# Patient Record
Sex: FEMALE | Race: WHITE
[De-identification: names, ages, dates, MRNs, and addresses within clinical notes are randomized per-mention and may not be internally consistent; named-entity substitution may affect disease eponyms.]

---

## 2018-04-14 NOTE — ED
Eleuterio CASTILLO Jason, scribed for Sabino Salinas MD on 04/14/18 at 1613 .





Abdominal Pain/Male





- HPI Summary


HPI Summary: 


This patient is a 77 year old F presenting to [facility] accompanied by female 

 with a chief complaint of abdominal pain since 3 days ago. She states 

she was recently seen by her PCP and was given antibiotics with a diagnosis of 

diverticulitis. Since the start of her treatment she has been having upper 

abdominal discomfort. The patient rates the pain 3/10 in severity. Symptoms 

aggravated by nothing. Symptoms alleviated by nothing.








- History of Current Complaint


Chief Complaint: EDAbdPain


Stated Complaint: GENERAL ILLNESS


Hx Obtained From: Patient


Onset/Duration: Gradual Onset, Still Present


Pain Intensity: 3


Pain Scale Used: 0-10 Numeric


Location: Epigastric


Aggravating Factor(s): Nothing


Alleviating Factor(s): Nothing


Associated Signs And Symptoms: Positive: Negative - fever





- Allergies/Home Medications


Allergies/Adverse Reactions: 


 Allergies











Allergy/AdvReac Type Severity Reaction Status Date / Time


 


MS Azithromycin Allergy  Nausea And Verified 10/30/17 12:05





[From Zithromax]   Vomiting  











Home Medications: 


 Home Medications





Aspirin EC TAB* [Ecotrin EC Low Dose 81 MG*] 81 mg PO DAILY 04/14/18 [History 

Confirmed 04/14/18]


Calcium Carbonate/Vitamin D3 [Calcium 500 + Vit D Caplet] 1 cap PO DAILY 04/14/ 18 [History Confirmed 04/14/18]


Ciprofloxacin TAB* [Cipro 500 MG TAB*] 500 mg PO BID 04/14/18 [History 

Confirmed 04/14/18]


Citalopram TAB* [CeleXA TAB*] 10 mg PO DAILY 04/14/18 [History Confirmed 04/14/ 18]


Letrozole (NF) [Femara (NF)] 2.5 mg PO DAILY 04/14/18 [History Confirmed 04/14/ 18]


metroNIDAZOLE TAB* [Flagyl 250 mg TAB*] 500 mg PO TID 04/14/18 [History 

Confirmed 04/14/18]











PMH/Surg Hx/FS Hx/Imm Hx


Previously Healthy: Yes


Endocrine/Hematology History: 


   Denies: Hx Diabetes


Cardiovascular History: 


   Denies: Hx Hypertension


 History: 


   Denies: Hx Renal Disease


Musculoskeletal History: 


   Denies: Hx Osteoporosis





- Cancer History


Cancer Type, Location and Year: BREAST WITH RADIATION





- Surgical History


Surgery Procedure, Year, and Place: BIOPSY RIGHT BREAST-PINNED, KNEE REPLACED 

AT [facility] 2013, RIGHT MASCTECOMY 2015, RIGHT KNEE 2017


Infectious Disease History: No


Infectious Disease History: 


   Denies: Traveled Outside the [place] in Last 30 Days





- Family History


Known Family History: 


   Negative: Blood Disorder





- Social History


Alcohol Use: Rare


Substance Use Type: Reports: None


Smoking Status (MU): Never Smoked Tobacco





Review of Systems


Negative: Fever


Positive: Abdominal Pain - epigastric


All Other Systems Reviewed And Are Negative: Yes





Physical Exam





- Summary


Physical Exam Summary: 


General: well-appearing, no pain distress


Skin: warm, color reflects adequate perfusion, dry


Head: normal


Eyes: EOMI, MARKY


ENT: normal


Neck: supple, nontender


Respiratory: CTA, breath sounds present


Cardiovascular: RRR


Abdomen: soft, nontender


Bowel: positive bowel sounds


Musculoskeletal: normal, strength/ROM intact


Neurological: normal, sensory/motor intact, A&O x3


Psychological: affect/mood appropriate





Triage Information Reviewed: Yes


Vital Signs On Initial Exam: 


 Initial Vitals











Temp Pulse Resp BP Pulse Ox


 


 99.3 F   91   16   100/73   98 


 


 04/14/18 12:41  04/14/18 12:41  04/14/18 12:41  04/14/18 12:41  04/14/18 12:41











Vital Signs Reviewed: Yes





Diagnostics





- Vital Signs


 Vital Signs











  Temp Pulse Resp BP Pulse Ox


 


 04/14/18 15:30   86   110/64  97


 


 04/14/18 15:00   89   113/61  100


 


 04/14/18 14:45   90    98


 


 04/14/18 14:44     111/61 


 


 04/14/18 12:41  99.3 F  91  16  100/73  98














- Laboratory


Lab Statement: Any lab studies that have been ordered have been reviewed, and 

results considered in the medical decision making process.





Re-Evaluation





- Re-Evaluation


  ** First Eval


Re-Evaluation Time: 16:05


Change: Improved


Comment: Pt received Zofran and felt better. Patient would like to go home and 

does not want further treatment. She will be discharged and follow up with PCP 

if symptoms return.





Abdominal Pain Fem Course/Dx





- Course


Course Of Treatment: PATIENT IMPROVED AFTER ZOFRAN 4MG ODT. SHE FELT BETTER AND 

WISHED TO LEAVE THE ED WITHOUT FURTHER EVALUATION AND TREATMENT.  PATIENT WILL F

/U WITH PMD; RETURN IF WORSE.





- Diagnoses


Provider Diagnoses: 


 Abdominal pain








Discharge





- Sign-Out/Discharge


Documenting (check all that apply): Discharge





- Discharge Plan


Condition: Stable


Disposition: HOME


Prescriptions: 


Ondansetron ODT TAB* [Zofran 4 MG Odt TAB*] 4 mg PO Q6H PRN #15 tab.odt


 PRN Reason: Nausea


Patient Education Materials:  Acute Abdominal Pain (ED)


Referrals: 


Bayron Grijalva MD [Primary Care Provider] - 


Additional Instructions: 


FOLLOW UP WITH YOUR DOCTOR.


RETURN TO THE EMERGENCY DEPARTMENT FOR ANY WORSENING OF YOUR CONDITION OR 

QUESTIONS OR CONCERNS.





- Billing Disposition and Condition


Condition: STABLE


Disposition: HOME





The documentation as recorded by the Eleuterio lopez Jason accurately 

reflects the service I personally performed and the decisions made by me, 

Sabino Salinas MD.

## 2018-04-15 NOTE — ED
Claudia CASTILLO Abhishek, scribed for Sabino Salinas MD on 04/15/18 at 1530 .





Abdominal Pain/Female





- HPI Summary


HPI Summary: 


The pt is a 76 y/o female presenting to the [facility] with a chief complaint of abd 

pain. The pt reports of vomiting, fatigue, bloated, and previous PMHx of 

diverticulitis. Pt denies urinary symptoms, irregular BM, and hematochezia. The 

pain is stated to be 6/10 in severity. The pt has presented these symptoms to 

the [facility] yesterday and was given anti-nausea medicine. Symptoms are aggravated 

by nothing. Symptoms are alleviated by nothing. 








- History of Current Complaint


Chief Complaint: EDAbdPain


Stated Complaint: NAUSEA/ILLNESS


Time Seen by Provider: 04/15/18 15:10


Hx Obtained From: Patient, Other: - Female 


Onset/Duration: Sudden Onset


Timing: Constant


Severity Initially: Moderate


Severity Currently: Moderate


Pain Intensity: 6


Pain Scale Used: 0-10 Numeric


Location: Diffuse


Aggravating Factor(s): Nothing


Alleviating Factor(s): Nothing


Associated Signs and Symptoms: Positive: Nausea, Vomiting, Other: - fatigue, 

bloating, normal BM, no hematochezia.  Negative: Urinary Symptoms


Allergies/Adverse Reactions: 


 Allergies











Allergy/AdvReac Type Severity Reaction Status Date / Time


 


No Known Allergies Allergy   Verified 04/15/18 15:00














PMH/Surg Hx/FS Hx/Imm Hx


Endocrine/Hematology History: 


   Denies: Hx Diabetes


Cardiovascular History: 


   Denies: Hx Hypertension


 History: 


   Denies: Hx Renal Disease


Musculoskeletal History: 


   Denies: Hx Osteoporosis





- Cancer History


Cancer Type, Location and Year: BREAST WITH RADIATION





- Surgical History


Surgery Procedure, Year, and Place: BIOPSY RIGHT BREAST-PINNED, KNEE REPLACED 

AT [facility] 2013, RIGHT MASCTECOMY 2015, RIGHT KNEE 2017


Infectious Disease History: No


Infectious Disease History: 


   Denies: Traveled Outside the [place] in Last 30 Days





- Family History


Known Family History: 


   Negative: Blood Disorder


Family History: Arthirtis, heart disease, Cancer





- Social History


Alcohol Use: Rare


Substance Use Type: Reports: None


Smoking Status (MU): Never Smoked Tobacco





Review of Systems


Positive: Fatigue


Eyes: Negative


ENT: Negative


Cardiovascular: Negative


Respiratory: Negative


Gastrointestinal: Other - Normal BM


Positive: Abdominal Pain, Vomiting, Other - Negative hematochezia


Positive: no symptoms reported


Musculoskeletal: Negative


Skin: Negative


Neurological: Negative


Psychological: Normal


All Other Systems Reviewed And Are Negative: Yes





Physical Exam





- Summary


Physical Exam Summary: 





General: well-appearing, mild pain distress


Skin: warm, color reflects adequate perfusion, dry


Head: normal


Eyes: EOMI, MARKY


ENT: normal, Real mucosa dry


Neck: supple, nontender


Respiratory: CTA, breath sounds present


Cardiovascular: RRR, No tachy cardia


Abdomen: soft,  No CVA tenderness, Diffuse abd bloating


Bowel: hypoactive bowel sound Tympanea; diffuse tenderness in the left lower 

quadrant and right lower quadrant


Musculoskeletal: normal, strength/ROM intact


Neurological: normal, sensory/motor intact, A&O x3


Psychological: affect/mood appropriate











Triage Information Reviewed: Yes


Vital Signs On Initial Exam: 


 Initial Vitals











Temp Pulse Resp BP Pulse Ox


 


 99.4 F   117   18   90/69   100 


 


 04/15/18 14:58  04/15/18 14:58  04/15/18 14:58  04/15/18 14:58  04/15/18 14:58











Vital Signs Reviewed: Yes





Diagnostics





- Vital Signs


 Vital Signs











  Temp Pulse Resp BP Pulse Ox


 


 04/15/18 14:58  99.4 F  117  18  90/69  100














- Laboratory


Lab Results: 


 Lab Results











  04/15/18 04/15/18 04/15/18 Range/Units





  15:45 15:45 15:45 


 


WBC     (3.5-10.8)  10^3/ul


 


RBC     (4.0-5.4)  10^6/ul


 


Hgb     (12.0-16.0)  g/dl


 


Hct     (35-47)  %


 


MCV     (80-97)  fL


 


MCH     (27-31)  pg


 


MCHC     (31-36)  g/dl


 


RDW     (10.5-15)  %


 


Plt Count     (150-450)  10^3/ul


 


MPV     (7.4-10.4)  um3


 


Neut % (Auto)     (38-83)  %


 


Lymph % (Auto)     (25-47)  %


 


Mono % (Auto)     (0-7)  %


 


Eos % (Auto)     (0-6)  %


 


Baso % (Auto)     (0-2)  %


 


Absolute Neuts (auto)     (1.5-7.7)  10^3/ul


 


Absolute Lymphs (auto)     (1.0-4.8)  10^3/ul


 


Absolute Monos (auto)     (0-0.8)  10^3/ul


 


Absolute Eos (auto)     (0-0.6)  10^3/ul


 


Absolute Basos (auto)     (0-0.2)  10^3/ul


 


Absolute Nucleated RBC     10^3/ul


 


Nucleated RBC %     


 


INR (Anticoag Therapy)  0.94    (0.77-1.02)  


 


APTT  23.2 L    (26.0-36.3)  seconds


 


Sodium    137 L  (139-145)  mmol/L


 


Potassium    4.1  (3.5-5.0)  mmol/L


 


Chloride    102  (101-111)  mmol/L


 


Carbon Dioxide    23  (22-32)  mmol/L


 


Anion Gap    12 H  (2-11)  mmol/L


 


BUN    27 H  (6-24)  mg/dL


 


Creatinine    1.04 H  (0.51-0.95)  mg/dL


 


Est GFR ( Amer)    66.4  (>60)  


 


Est GFR (Non-Af Amer)    51.7  (>60)  


 


BUN/Creatinine Ratio    26.0 H  (8-20)  


 


Glucose    111 H  ()  mg/dL


 


Lactic Acid     (0.5-2.0)  mmol/L


 


Calcium    9.1  (8.6-10.3)  mg/dL


 


Magnesium    2.0  (1.9-2.7)  mg/dL


 


Total Bilirubin    0.60  (0.2-1.0)  mg/dL


 


AST    18  (13-39)  U/L


 


ALT    8  (7-52)  U/L


 


Alkaline Phosphatase    45  ()  U/L


 


Total Creatine Kinase    52  ()  U/L


 


CK-MB (CK-2)    1.7  (0.6-6.3)  ng/mL


 


Troponin I    0.01  (<0.04)  ng/mL


 


C-Reactive Protein    78.76 H  (< 5.00)  mg/L


 


B-Natriuretic Peptide   20  ( - 100) pg/mL


 


Total Protein    6.5  (6.4-8.9)  g/dL


 


Albumin    3.4  (3.2-5.2)  g/dL


 


Globulin    3.1  (2-4)  g/dL


 


Albumin/Globulin Ratio    1.1  (1-3)  


 


TSH    1.24  (0.34-5.60)  mcIU/mL














  04/15/18 04/15/18 Range/Units





  15:45 15:45 


 


WBC  9.3   (3.5-10.8)  10^3/ul


 


RBC  3.90 L   (4.0-5.4)  10^6/ul


 


Hgb  12.0   (12.0-16.0)  g/dl


 


Hct  36   (35-47)  %


 


MCV  92   (80-97)  fL


 


MCH  31   (27-31)  pg


 


MCHC  34   (31-36)  g/dl


 


RDW  14   (10.5-15)  %


 


Plt Count  332   (150-450)  10^3/ul


 


MPV  8.2   (7.4-10.4)  um3


 


Neut % (Auto)  77.6   (38-83)  %


 


Lymph % (Auto)  11.2 L   (25-47)  %


 


Mono % (Auto)  10.0 H   (0-7)  %


 


Eos % (Auto)  0.5   (0-6)  %


 


Baso % (Auto)  0.7   (0-2)  %


 


Absolute Neuts (auto)  7.2   (1.5-7.7)  10^3/ul


 


Absolute Lymphs (auto)  1.0   (1.0-4.8)  10^3/ul


 


Absolute Monos (auto)  0.9 H   (0-0.8)  10^3/ul


 


Absolute Eos (auto)  0   (0-0.6)  10^3/ul


 


Absolute Basos (auto)  0.1   (0-0.2)  10^3/ul


 


Absolute Nucleated RBC  0   10^3/ul


 


Nucleated RBC %  0   


 


INR (Anticoag Therapy)    (0.77-1.02)  


 


APTT    (26.0-36.3)  seconds


 


Sodium    (139-145)  mmol/L


 


Potassium    (3.5-5.0)  mmol/L


 


Chloride    (101-111)  mmol/L


 


Carbon Dioxide    (22-32)  mmol/L


 


Anion Gap    (2-11)  mmol/L


 


BUN    (6-24)  mg/dL


 


Creatinine    (0.51-0.95)  mg/dL


 


Est GFR ( Amer)    (>60)  


 


Est GFR (Non-Af Amer)    (>60)  


 


BUN/Creatinine Ratio    (8-20)  


 


Glucose    ()  mg/dL


 


Lactic Acid   1.4  (0.5-2.0)  mmol/L


 


Calcium    (8.6-10.3)  mg/dL


 


Magnesium    (1.9-2.7)  mg/dL


 


Total Bilirubin    (0.2-1.0)  mg/dL


 


AST    (13-39)  U/L


 


ALT    (7-52)  U/L


 


Alkaline Phosphatase    ()  U/L


 


Total Creatine Kinase    ()  U/L


 


CK-MB (CK-2)    (0.6-6.3)  ng/mL


 


Troponin I    (<0.04)  ng/mL


 


C-Reactive Protein    (< 5.00)  mg/L


 


B-Natriuretic Peptide   ( - 100) pg/mL


 


Total Protein    (6.4-8.9)  g/dL


 


Albumin    (3.2-5.2)  g/dL


 


Globulin    (2-4)  g/dL


 


Albumin/Globulin Ratio    (1-3)  


 


TSH    (0.34-5.60)  mcIU/mL











Result Diagrams: 


 04/15/18 15:45





 04/15/18 15:45


Lab Statement: Any lab studies that have been ordered have been reviewed, and 

results considered in the medical decision making process.





- Radiology


  ** Chest X-ray


Radiology Interpretation Completed By: Radiologist -  CXR reveals Retrocardiac 

density could be atelectasis and/or consolidation. ED Physician has reviewed 

this radiology report





- CT


  ** CT Chest/Abdomen/Pelvis


CT Interpretation Completed By: Radiologist - CT Chest/Abdomen/Pelvis 1. There 

are mixed attenuation and ill-defined masses in the pelvis in the presence of 

large volume peritoneal ascites, soft tissue masses and peritoneal caking. This 

constellation of findings is most consistent with stage IV metastatic cancer of 

a gynecologic origin. The uterus is ill-defined or possibly surgically absent. 

Please correlate to details of the patient's surgical history. Endometrial 

carcinoma versus ovarian cancer are favored. 2. Small left pleural effusion 

with left lung base atelectasis corresponding to the same day chest x-ray 

images. 3. Additional chronic and degenerative changes described in the body 

the report. ED Physician has reviewed this radiology report.





  ** Brain CT


CT Interpretation Completed By: Radiologist - Brain CT reveals Age-appropriate 

findings include appearance of chronic microvascular disease and mild symmetric 

involutional change without acute intracranial findings. ED Physician has 

reviewed this radiology report





- EKG


  ** 1514


EKG Rhythm: Sinus Rhythm - 99 bpm


EKG Interpretation: An EKG at 1514 reveals borderline low voltage extremity 

leads





Abdominal Pain Fem Course/Dx





- Course


Course Of Treatment: DISCUSSED RESULTS WITH PATIENT.  ADMIT HOSPITALIST.





- Diagnoses


Provider Diagnoses: 


 Abdominal pain, Vomiting, Abdominal mass








Discharge





- Sign-Out/Discharge


Documenting (check all that apply): Discharge





- Discharge Plan


Condition: Stable


Disposition: ADMITTED TO [facility]


Referrals: 


Bayron Grijalva MD [Primary Care Provider] - 





- Billing Disposition and Condition


Condition: STABLE


Disposition: [facility]





The documentation as recorded by the Claudia lopez Abhishek accurately reflects 

the service I personally performed and the decisions made by me, Sabino Salinas MD.

## 2018-04-15 NOTE — HP
CC:  Dr. Grijalva; Dr. York *

 

HISTORY AND PHYSICAL:

 

DATE OF ADMISSION:  04/15/18

 

TIME OF EVALUATION:  7:00 p.m.

 

PRIMARY CARE PHYSICIAN:  Dr. Grijalva.

 

CHIEF COMPLAINT:  Abdominal pressure, nausea, and vomiting.

 

HISTORY OF PRESENT ILLNESS:  This is a 77-year-old female with a history of 
breast cancer that is in remission who presents with 3 weeks of abdominal 
pressure and fullness associated with nausea and vomiting.  She has a history 
of diverticulitis in October, so when she presented with similar symptoms 1 
week ago to her PCP, he prescribed Cipro and metronidazole for a presumed 
recurrent diverticulitis. However, she had ongoing nausea, vomiting, and 
abdominal pressure, which has continued to get worse and yesterday came to the 
emergency department but left without being seen.  Today, she came back because 
the abdominal pressure continued and in the emergency department, she has 
findings concerning for metastatic gynecologic cancer in combination with the 
need for symptom control.  We were asked to evaluate for admission.

 

She localizes the pain to the bilateral lower abdomen without radiation and 
describes it more of a discomfort and pressure than pain.  She states that it 
is constant and unchanged with position or eating.  She also localizes the pain 
to the epigastrium; however, she states the pain is quite diffuse and she 
cannot point to it with 1 finger.  It has been associated with nausea off and 
on for the past couple of weeks, approximately a 15-pound weight loss.  She 
denies night sweats, vaginal bleeding, melena, but complains of a very poor 
appetite and extreme fatigue over the past week.

 

PAST MEDICAL HISTORY:

1.  Breast cancer status post lumpectomy in the 90s and then a mastectomy in 
2015, this is currently in remission.

2.  Bilateral total knee replacements.

 

HOME MEDICATIONS:

1.  Citalopram 10 mg daily.

2.  Letrozole 2.5 mg daily.

 

She had recently been taking ciprofloxacin and metronidazole.

 

SOCIAL HISTORY:  She currently lives at [facility] where her  is in the 
skilled section and she is in the independent section.

 

REVIEW OF SYSTEMS:  Positive for fatigue, weight loss, nausea, vomiting.  She 
denies fevers, chills, diarrhea, constipation, lymphadenopathy, melena, vaginal 
bleeding.

 

                               PHYSICAL EXAMINATION

 

GENERAL:  Alert thin female, in no distress.

 

VITAL SIGNS:  Blood pressure 123/71, pulse ox 95% on room air, heart rate 94, 
temperature 100.5.

 

HEENT:  Pupils equal, round, and reactive to light.  Some temporal wasting is 
noted.  Mucosa is dry.  No pharyngeal exudates, erythema.

 

NECK:  I can feel no supraclavicular or cervical lymphadenopathy.  No axillary 
lymphadenopathy.  No JVP.  Thyroid is nonpalpable.

 

LUNGS:  Clear bilaterally.  No crackles.

 

CHEST:  Regular rate and rhythm.

 

ABDOMEN:  Distended with a fluid wave, dullness to percussion.  Her liver is 
palpable at the costal margin.  She is mildly tender to deep palpation 
diffusely.

 

EXTREMITIES:  Bilateral TKA.  Incisions are well healed.  Distal pulses are 2+. 
She has no lower extremity edema.

 

 LABORATORY DATA/DIAGNOSTIC STUDIES:  White blood cells 9.3, hemoglobin 12, 
platelets 332,000.  Lactate 1.4.  Sodium 137, potassium 4.1, bicarb 23, 
chloride 102, BUN 27, creatinine 1.04.

 

Chest, abdomen, and pelvis CT scan was obtained and shows mixed attenuation, ill
- defined masses in the pelvis and the presence of large volume peritoneal 
ascites, soft tissue masses, and peritoneal caking.  This constellation of 
findings is most consistent with stage IV metastatic cancer of gynecological 
origin.  The uterus is ill defined or possibly surgically absent and a small 
left pleural effusion with left lung base atelectasis corresponding to the same 
day chest x-ray images.

 

ASSESSMENT AND PLAN:  This is a 77-year-old female with history of breast cancer
, currently on letrozole who presents with 3 weeks of abdominal pressure and 
nausea and vomiting and was found to have an abnormal CT in the emergency 
department.

 

1.  CT findings concerning for stage IV gynecologic cancer.  Unfortunately, 
this seems to have developed very quickly suggesting a rather aggressive 
cancer.  A CT scan from October showed no gynecologic abnormalities.  I have 
discussed this case with Dr. York and an oncologist will see Ms. Harley in the 
morning to offer recommendations about staging and diagnosis.  I have discussed 
the CT findings with Ms. Harley as well as her friend, Dr. Weeks.  I am 
checking a CA-125 and I believe she will have some relief with a therapeutic 
paracentesis as well as a diagnostic paracentesis to aid in the workup and 
staging.  In the meantime, I will admit her for symptom control and give her 
Zofran and morphine as needed.  She has received 2 L of IV fluids in the 
emergency department.  I believe she would benefit from more volume 
resuscitation as she still appears to be dry.

2.  History of breast cancer.  Continue letrozole.  This does not appear to be 
related.

3.  Depression.  Continue citalopram.

4.  DVT prophylaxis.  SCDs given the positive gastric occult blood finding in 
the emergency department; however, this is a nonspecific finding and I suspect 
it is most likely related to irritation from 3 weeks of vomiting.  Her 
hemoglobin is stable but this should be closely monitored.

 

[id number]/[facility] #: [id number]

[facility] [place]

## 2018-04-16 NOTE — CONS
CONSULTATION REPORT:

 

DATE OF CONSULT:  18

 

REASON FOR CONSULT:  Pelvic mass.

 

IDENTIFICATION:  A 77-year-old female with history of breast cancer x2, who now 
presents with abdominal bloating.

 

HISTORY OF PRESENT ILLNESS:  Ms. Harley is well known to our service for a 
history of breast cancer and is currently on Femara 2.5 mg daily, followed 
every 6 months. She had a knee surgery this past October and then had abdominal 
bloating and pain since then.  She was originally diagnosed with diverticulitis 
and treated with a course of antibiotics.  She had a temporary relief but then 
approximately 4 weeks ago, symptoms reoccurred.  She describes these as 
abdominal pain, bloating, some nausea and decreased appetite. Symptoms are 
progressing weekly. She saw her primary care doctor approximately 1 week ago 
and she had a repeat course of ciprofloxacin and metronidazole.  Abdominal 
bloating and pressure increased as well as increasing nausea and she came into 
the emergency room on 04/15/18.

 

She had a CT scan of the chest, abdomen and pelvis.  There is no pulmonary 
disease, no liver disease, and no clear bone disease.  However, there is 
diffuse omental caking both in the pelvis and the upper abdomen, ascites.  
Majority of the mass is down in the pelvis, it appears to focus on the left 
side representing potentially a complex ovarian cyst.  She had a CA-125 of 395.

 

PAST MEDICAL HISTORY:

1.  Breast cancer.  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 
and radiation.  No hormone therapy.  2014, right-sided nodularity on 
mammogram, confirmed with MRI and a biopsy showed grade 2 ER/CA positive, HER2/
kip negative breast cancer.  She had a mastectomy with T1a disease, 4 mm cancer 
and has subsequently been on Femara which she has tolerated without difficulty.

2.  Osteoarthritis.

 

PAST SURGICAL HISTORY:

1.  Lumpectomy followed by mastectomy.

2.  Cataract removal.

3.  D and C in the past.

4.  Left knee replacement in 2017.

 

ALLERGIES:  ZITHROMAX.

 

FAMILY HISTORY:  Mother had breast cancer at an early age and  in her 70s 
of cancer.  No sisters, no aunts.

 

SOCIAL HISTORY:  .  Retired , never smoked.  She has 2 
children who are both adopted.

 

REVIEW OF SYSTEMS:  General:  Weak, feeling tired.  Allergic/Immunologic: 
Negative.  Eyes:  Negative.  HEENT:  Negative.  Endocrine:  On hormone therapy. 
Hematologic/Lymphatic:  No lymphadenopathy.  Breasts:  Status post mastectomy, 
stable.  Respiratory:  No shortness of breath.  Cardiac:  No chest pain.  GI:  
As above.  :  Has some polyuria and urinates at night.  Musculoskeletal:  
Knee replacement, otherwise negative.  Neurologic:  Negative.  Psychiatric:  
Anxiety.

 

PHYSICAL EXAM:  Temperature 97.5, /67, pulse 80, respirations 16, sat 95%
. HEENT:  Mucosa moist.  No lesions.  Conjunctivae slightly pale.  Neck:  No 
cervical or supraclavicular lymphadenopathy.  Lungs:  Clear to auscultation.  
Heart: Regular rate and rhythm.  S1, S2.  No murmurs, rubs or gallops.  Abdomen
: Distended with caking as well as bowel distention and ascitic fluid.  She has 
bowel sounds.  She is diffusely tender but no rebound or guarding.  Extremities
:  No clubbing, cyanosis or edema.  Neurologic:  Grossly nonfocal.

 

DIAGNOSTIC STUDIES/LAB DATA:  As noted above.

 

ASSESSMENT AND PLAN:  A 77-year-old female with history of breast cancer times 
twice, now with pelvic mass suspicious for ovarian cancer.  We discussed the 
potential for ovarian cancer as well as differential diagnosis of endometrial 
cancer, primary peritoneal cancer, recurrent breast cancer is possible.

 

1.  Agree with plan for paracentesis, send for cytology.  If diagnostic, then 
proceed with treatment.  If nondiagnostic, will need CT-guided or ultrasound-
guided biopsy.

2.  Reviewed that treatment that could include chemotherapy and surgery as well 
as quality of life goals.

3. Discussed and she differed BRCA testing in  as she has no biologic 
children and has a conservative perfective on intervention. However, there are 
therapeutic implications if she has ovarian cancer and should be checked 
pending final diagnosis. 

4.  Will confirm HER2/kip +2 on prior breast cancer and report of negative FISH 
from [place], sent for FISH report. 

5.  Continue IV fluids.

6.  Pain is controlled at this time.

7.  Can go to oncology service as she has been followed in my clinic previously.

 

 [id number]/CPS #: [id number]

LAN

## 2018-04-23 NOTE — RAD
INDICATION: chest port placement



COMPARISONS: None relevant



TECHNIQUE: Fluoroscopy was provided for a vascular access procedure. Total fluoroscopy

time is: 62.5 seconds



FINDINGS: Spot images demonstrate a left-sided chest port from a subclavian approach with

the tip overlying the cavoatrial junction.



IMPRESSION: FLUOROSCOPY WAS PROVIDED FOR A VASCULAR ACCESS PROCEDURE



CPT II Codes:

## 2018-04-23 NOTE — OP
CC:  Dr. York; [facility]*

 

OPERATIVE REPORT:

 

DATE OF OPERATION:  04/23/18 - [facility] [place]

 

DATE OF BIRTH:  [birth year]

 

SURGEON:  Cayetano Fam MD

 

ASSISTANT:  None.



ANESTHESIOLOGIST:  Nina Sanchez MD

 

ANESTHESIA:  Local with monitored anesthesia care.

 

PRE-OP DIAGNOSIS:  Ovarian cancer.

 

POST-OP DIAGNOSIS:  Ovarian cancer.

 

OPERATIVE PROCEDURE:  Insertion of a left chest wall 8-Kinyarwanda PowerPort via a 
left cephalic vein open cut down technique.

 

ESTIMATED BLOOD LOSS:  Minimal.

 

WOUND CLASSIFICATION:  1.

 

COMPLICATIONS:  None.

 

DRAINS:  None.

 

SPECIMENS:  None.

 

DESCRIPTION OF PROCEDURE:  Written informed consent was obtained.  The left 
chest was marked with indelible ink and preoperative antibiotics were 
administered.  The patient was taken to the operating room and placed in the 
supine position. Sequential compression devices and warming blanket were 
applied.  Anesthesia was administered and the left chest and neck were prepped 
and draped in the usual sterile fashion with the left arm at the side.

 

Time-out verification was completed.

 

Initially, 1% lidocaine was infiltrated and the left deltopectoral groove and 
an oblique incision was made between the muscle bodies and identified and 
dissected free of a very acceptable size cephalic vein, which was encircled in 
2 places with 3-0 silk ties.

 

A venotomy was then made and the catheter under direct vision was advanced into 
the vein and passed under fluoroscopy into the right heart at the junction of 
the superior vena cava in the right atrium.  This catheter withdrew blood well 
with flushed nicely.

 

Lidocaine was infiltrated along the left anterior chest wall in medial and 
slightly inferior to my incision.  I developed a subcutaneous pocket down in 
the muscle fascia in preparation for placement of the port.  Once this was 
adequate size, the catheter was adjusted to the correct length using fluoro and 
cut to the appropriate length and attached to the port in the usual fashion and 
subsequently placed in the pocket.  It was sutured to the muscle fascia in 2 
places with 2-0 Prolene sutures.

 

The catheter flushed well and withdrew blood without difficulty.  It was 
subsequently flushed with saline and then heparin solution.

 

Hemostasis was assured.  I ligated the distal left cephalic vein.  The oblique 
incision was closed with running 3-0 and 4-0 subcuticular Vicryl sutures.  Steri
- Strips and occlusive dressing were applied.  The patient tolerated the 
procedure well and was taken to the recovery room in stable condition.

 

 [id number]/[facility] #: [id number]

MTDD

## 2018-04-23 NOTE — RAD
HISTORY: Status post PowerPort placement



COMPARISONS: April 15, 2018



VIEWS: 1: frontal portable view of the chest at 3:01 PM



FINDINGS:

LINES AND TUBES: A left-sided chest port is noted from a subclavian approach with the tip

overlying the cavoatrial junction.

CARDIOMEDIASTINAL SILHOUETTE: The cardiomediastinal silhouette is normal for portable

technique.

PLEURA: The costophrenic angles are sharp. No pleural abnormalities are noted. There is no

appreciable pneumothorax.

LUNG PARENCHYMA: There is minimal linear opacification of the left lung base

ABDOMEN: The upper abdomen is clear. There is no subphrenic gas.

BONES AND SOFT TISSUES: Degenerative changes are noted.



IMPRESSION: 

1.  LINES AND TUBES AS ABOVE. NO ACTIVE CARDIOPULMONARY DISEASE.

2.  LINEAR ATELECTASIS OF THE LEFT LUNG BASE.

## 2018-04-23 NOTE — BRIEFOPN
Brief Operative Note





- Surgery


Procedures: 


Procedures








OPERATIVE REPORT





PRE-OP: Ovarian cancer





POST-OP: Same





PROCEDURE: insertion of right chest wall 8F PowerPort via left cephalic vein cut

-down





SURGEON: MD Cristel


ANESTHESIA:Local with MAC         Dr. Sanchez


ASST: none


IVF:min


EBL:min


SPECIMEN:none


DRAIN: none


WOUND CLASS:one


COMPLICATIONS: none


TO PACU

## 2018-04-29 NOTE — RAD
Indication: Nausea and vomiting. Abdominal pain. Assess for small bowel obstruction.

History of diverticulitis.



Comparison: April 23, 2018 chest radiograph. April 15, 2018 CT.



Technique: Supine and upright views of the abdomen.



Report: Small bilateral dependent pleural effusions and basilar consolidation most

suspicious for atelectasis without significant change.



No radiographic evidence for free air.



Moderate hiatal hernia. Negative for dilated bowel loops. Central displacement of the gas

containing bowel loops consistent with presence of ascites as previously documented on

April 15, 2018 abdomen CT. Moderate stool in the colon without significant rectal

distension.



Calcifications at the RIGHT upper quadrant corresponding with gallstones on prior CT exam.



Unremarkable soft tissue contours.



IMPRESSION: 

1. Small bilateral dependent pleural effusions and basilar consolidation most suspicious

for atelectasis without significant change. 

2. Large volume of ascites.

3. Cholelithiasis.

4. No evidence for bowel obstruction.

## 2018-04-30 NOTE — PN
Progress Note





- Progress Note


Date of Service: 04/30/18


SOAP: 


Subjective:


[]Feeling a little better today than she did on admission.  Less emesis, though 

remains nauseated near constantly with food apathy.  No further hematemesis 

that she is aware of.  Urinating well and having regular BMs.  Abd. feeling 

more full and curious about paracentesis.


Weak and always tired.  Has been getting OOB and ambulating short distances.


Aware of plan for chemotherapy and agreeable to this.


Wants to discuss transfusion with friend.





Medications:


Acetaminophen (Tylenol Tab*)  650 mg PO Q4H PRN


   PRN Reason: FEVER/PAIN


   Last Admin: 04/27/18 23:20 Dose:  650 mg


Citalopram Hydrobromide (Celexa Tab*)  10 mg PO QAM [facility]


   Last Admin: 04/29/18 08:20 Dose:  10 mg


Enoxaparin Sodium (Lovenox(*))  40 mg SUBCUT Q24H [facility]


   Last Admin: 04/29/18 16:46 Dose:  40 mg


Heparin Sodium (Porcine) (Heparin Flush Port (Ivad)**)  5 ml FLUSH DAILY [facility]


   PRN Reason: Protocol


   Last Admin: 04/29/18 08:23 Dose:  Not Given


Sodium Chloride (Ns 0.9% 1000 Ml*)  1,000 mls @ 80 mls/hr IV PER RATE [facility]


   Last Admin: 04/30/18 03:29 Dose:  80 mls/hr


Ondansetron HCl (Zofran Inj*)  8 mg IV Q6H PRN


   PRN Reason: NAUSEA/VOMITING


   Last Admin: 04/29/18 21:22 Dose:  8 mg


Oxycodone HCl (Roxycodone Tab*)  5 mg PO Q4H PRN


   PRN Reason: PAIN


Pantoprazole Sodium (Protonix Iv*)  40 mg IV DAILY [facility]


   Last Admin: 04/29/18 08:21 Dose:  40 mg


Prochlorperazine Edisylate (Compazine Inj*)  10 mg IV Q6H PRN


   PRN Reason: NAUSEA


   Last Admin: 04/30/18 04:03 Dose:  10 mg


Senna (Senokot Tab*)  1 tab PO BID [facility]


   Last Admin: 04/30/18 00:15 Dose:  Not Given


Zolpidem Tartrate (Ambien Tab*)  5 mg PO BEDTIME [facility]


   Last Admin: 04/29/18 21:23 Dose:  5 mg





Objective:


[]


 Vital Signs











Temp Pulse Resp BP Pulse Ox


 


 98.2 F   97   20   117/55   98 


 


 04/30/18 07:27  04/30/18 07:27  04/30/18 07:27  04/30/18 07:27  04/30/18 07:27








A&Ox3, EOMI, WATKINS, neuro grossly non-focal


HRR, S1S2 without murmur noted


LS clear bilat.


+BS, abd. softly distended with +fluid wave





Assessment:


[]76 yo female with recently diagnosed locally advanced ovarian cancer admitted 

with hematemesis which appears resolved (likely secondary to esophageal tear 

with wretching).  Treatment has been partially delayed by pt.'s declining 

performance status, however one goal of treatment is to improved pt.'s 

symptoms.  We discussed pursuing cycle 1 today as inpatient and likely staying 

in the hospital for another day or so with cont.'d IV support.








Plan:


[]1. Chemotherapy today: Carboplating AUC 6 [id number] and Paclitaxel 60 mg/m2 

weekly


2. Recommend 1 unit PRBCs today, reviewed risks and benefits and she is apt to 

agree but would like to discuss with her friend/advocate Dr. Flores


3. Suggest appetite stimulant as nutritional status is very poor, will trial 

marinol 5 mg PO ac


4. Consider paracentesis tomorrow

## 2018-05-01 NOTE — PN
Progress Note





- Progress Note


Date of Service: 05/01/18


SOAP: 


Subjective:


[]Feeling crummy today and frustrated as yesterday evening felt much better.


Tired and nauseated.  No emesis.


Not hungry.  Feels "pilled out".


Has a headache.


Very full feeling and agreeable to paracentesis.





Medications:


Acetaminophen (Tylenol Tab*)  650 mg PO Q4H PRN


   PRN Reason: FEVER/PAIN


   Last Admin: 04/27/18 23:20 Dose:  650 mg


Citalopram Hydrobromide (Celexa Tab*)  10 mg PO QAM [facility]


   Last Admin: 05/01/18 09:57 Dose:  10 mg


Dronabinol (Marinol Cap*)  5 mg PO AC [facility]


   Last Admin: 05/01/18 07:58 Dose:  5 mg


Enoxaparin Sodium (Lovenox(*))  40 mg SUBCUT Q24H [facility]


   Last Admin: 04/30/18 17:06 Dose:  40 mg


Heparin Sodium (Porcine) (Heparin Flush Port (Ivad)**)  5 ml FLUSH DAILY [facility]


   PRN Reason: Protocol


   Last Admin: 05/01/18 09:49 Dose:  Not Given


Sodium Chloride (Ns 0.9% 1000 Ml*)  1,000 mls @ 80 mls/hr IV PER RATE [facility]


   Last Admin: 05/01/18 04:27 Dose:  80 mls/hr


Ondansetron HCl (Zofran Inj*)  8 mg IV Q6H PRN


   PRN Reason: NAUSEA/VOMITING


   Last Admin: 04/29/18 21:22 Dose:  8 mg


Oxycodone HCl (Roxycodone Tab*)  5 mg PO Q4H PRN


   PRN Reason: PAIN


Pantoprazole Sodium (Protonix Iv*)  40 mg IV DAILY [facility]


   Last Admin: 05/01/18 09:57 Dose:  40 mg


Prochlorperazine Edisylate (Compazine Inj*)  10 mg IV Q6H PRN


   PRN Reason: NAUSEA


   Last Admin: 05/01/18 10:04 Dose:  10 mg


Senna (Senokot Tab*)  1 tab PO BID [facility]


   Last Admin: 05/01/18 09:57 Dose:  1 tab


Zolpidem Tartrate (Ambien Tab*)  5 mg PO BEDTIME [facility]


   Last Admin: 05/01/18 01:52 Dose:  Not Given





Objective:


[]


 Vital Signs











Temp Pulse Resp BP Pulse Ox


 


 97.6 F   86   14   123/72   94 


 


 05/01/18 07:36  05/01/18 07:36  05/01/18 10:12  05/01/18 07:36  05/01/18 07:36





A&Ox3, EOMI, WATKINS, neuro grossly non-focal


HRR, S1S2


LS clear


+BS, abd. round and softly distended with +fluid wave


LLQ bruise extending to groin with purple and yellow decreased from 4/27


+PP=bilat., no edema noted


 Laboratory Results - last 24 hr











  04/29/18 05/01/18 05/01/18





  05:30 05:49 05:49


 


WBC   7.9 


 


RBC   3.16 L 


 


Hgb   9.8 L 


 


Hct   29 L 


 


MCV   92 


 


MCH   31 


 


MCHC   34 


 


RDW   15 


 


Plt Count   329 


 


MPV   7.6 


 


Neut % (Auto)   91.0 H 


 


Lymph % (Auto)   6.9 L 


 


Mono % (Auto)   1.9 


 


Eos % (Auto)   0 


 


Baso % (Auto)   0.2 


 


Absolute Neuts (auto)   7.2 


 


Absolute Lymphs (auto)   0.5 L 


 


Absolute Monos (auto)   0.2 


 


Absolute Eos (auto)   0 


 


Absolute Basos (auto)   0 


 


Absolute Nucleated RBC   0 


 


Nucleated RBC %   0.1 


 


Sodium    136 L


 


Potassium    3.9


 


Chloride    108


 


Carbon Dioxide    20 L


 


Anion Gap    8


 


BUN    23


 


Creatinine    0.91


 


Est GFR ( Amer)    77.5


 


Est GFR (Non-Af Amer)    60.3


 


BUN/Creatinine Ratio    25.3 H


 


Glucose    183 H


 


Calcium    8.1 L


 


Magnesium    2.0


 


Total Bilirubin    0.70


 


AST    31


 


ALT    13


 


Alkaline Phosphatase    44


 


Total Protein    5.3 L


 


Albumin    2.6 L


 


Globulin    2.7


 


Albumin/Globulin Ratio    1.0


 


Prealbumin    14 L


 


Blood Type  A Positive  


 


Antibody Screen  Negative  


 


Crossmatch  See Detail  











Assessment:


[]78 yo female with newly diagnosed locally advanced ovarian cancer now C1D2 

Carboplatin with weekly Taxol.  Ingleside much better yesterday and likely related 

to steroid pre-med.  Discussed therapeutic paracentesis today of which she 

consented and hopefully this will provide some relief as well.





Plan:


[]1. Malignant Ascites: Therapeutic Paracentesis as per procedure note


2. Nausea: related to chemotherapy and underlying disease, give Dex. 8 mg IV now

, cont. PRN meds though suggest avoiding ondansetron for today if possible.  

Will add PRN ativan for rescue trial


3. Headache: may be related 5HT3 antagonist given yesterday (Palonsetron), 

avoid ondansetron today if possible


4. Anorexia: cont. marinol and high calorie drinks for snacks, cont. IV fluids d

/t poor intake, tylenol PO OK


5. Anemia: improved with transfusion, cont. daily counts

## 2018-05-01 NOTE — PROCNOTE
Hematology/Oncology Procedure


Hematology/Oncology Procedure Note: 


Therapeutic Paracentesis:


Informed consent obtained.  Time out performed per protocol.  Anesthesia 1% 

lidocaine, approx. 1 mL administered to left lower quad with good effect.  

Paracentesis performed to left lower quad without obvious complications.  

Sanginous fluid unchanged from prior procedures.  Pt. tolerated well.

## 2018-05-02 NOTE — DS
CC:  Dr. Grijalva

 

DISCHARGE SUMMARY:

 

DATE OF ADMISSION:  04/27/18

 

DATE OF DISCHARGE:  05/02/18

 

DISCHARGE DIAGNOSES:

1.  Dehydration.

2.  GI bleed.

3.  Metastatic ovarian cancer, treatment with chemotherapy.

4.  Refractory ascites.

 

HOSPITAL COURSE:  She came in on 04/27/18 when she presented on Friday with dehydration as well as an
 episode of hematemesis.  She had a hemoglobin that dropped from baseline of 12 over the course of 2 
weeks down to 8.1.  She had reported an episode of hematemesis 2 days prior to admission.  She also h
ad excessive bleeding after a paracentesis.  She was in the hospital over the weekend and given IV fl
uids as well as 1 unit of packed red blood cells.  On 05/01/18, we proceeded with chemotherapy with c
arboplatin and Taxol.  She received and carboplatin AUC of 6 and Taxol has been given weekly 80 mg/m2
 each Monday.  She tolerated the chemotherapy well and has improved significantly over the past 2 day
s.  She did not have any additional episodes of hematemesis.  She has had some nausea after chemother
apy.  This responded best to Marinol.  She had a repeat paracentesis with bloody fluid and then she h
as had blood cells drained since then. I suspect her anemia is secondary to tumor.  She received IV f
luids and has had some lower extremity edema, albumin is 2.3.  She is eating much better today than s
he has been over the past several weeks.

 

We discussed today being at home versus being in the hospital.  I would like to see her out of the [facility].  She should call our office, however, with any difficulties.  Specifically, temperature of a 
100.4 would need immediate reevaluation.

 

DISCHARGE PLAN:

1.  She will go home to [facility] today.  She will need frequent dressing reddy
es and assistance with daily activities.  I am hopeful over the next 4 weeks, she can return to her a
partment.

2.  Anemia.  Her hemoglobin is down to 8.7 today and I think it is bleeding from her tumor itself.  JENNIFER leach will recheck a CBC on Friday.

3.  She has had regular diet and eating better.  She continued Marinol 2.5 mg with meals p.r.n. for n
ausea.

4.  She had taken oxycodone in the hospital and I sent a prescription home with her followup 5 mg q.4
 p.r.n.  Pain has been well controlled over the past 48 hours.

5.  We will plan on seeing her back in clinic on Monday, which will also be the date of her chemother
apy and she will receive Taxol.

 

 [id number]/[facility] #: [id number]

## 2018-05-02 NOTE — PN
Progress Note





- Progress Note


Date of Service: 05/02/18


SOAP: 


ADDENDUM: Slight drop in Hgb I suspect if bleeding from tumor, will check CBC 

on Friday.

## 2018-05-02 NOTE — PN
Progress Note





- Progress Note


Date of Service: 05/02/18


SOAP: 


Subjective:


[]Better today. Ate well yesterday. Marinol helps with nausea but make her 

"loopy".  Up and walking, less nausea and feeling well. Still some drainage 

from paracentesis. 








Acetaminophen (Tylenol Tab*)  650 mg PO Q4H PRN


   PRN Reason: FEVER/PAIN


   Last Admin: 04/27/18 23:20 Dose:  650 mg


Citalopram Hydrobromide (Celexa Tab*)  10 mg PO QAM [facility]


   Last Admin: 05/02/18 08:25 Dose:  10 mg


Dronabinol (Marinol Cap*)  5 mg PO AC [facility]


   Last Admin: 05/02/18 08:25 Dose:  5 mg


Enoxaparin Sodium (Lovenox(*))  40 mg SUBCUT Q24H [facility]


   Last Admin: 05/01/18 16:52 Dose:  40 mg


Heparin Sodium (Porcine) (Heparin Flush Port (Ivad)**)  5 ml FLUSH DAILY [facility]


   PRN Reason: Protocol


   Last Admin: 05/01/18 16:52 Dose:  5 ml


Lorazepam (Ativan Inj*)  0.5 mg IV PUSH Q6H PRN


   PRN Reason: Anxiety/nausea


Ondansetron HCl (Zofran Inj*)  8 mg IV Q6H PRN


   PRN Reason: NAUSEA/VOMITING


   Last Admin: 04/29/18 21:22 Dose:  8 mg


Oxycodone HCl (Roxycodone Tab*)  5 mg PO Q4H PRN


   PRN Reason: PAIN


Pantoprazole Sodium (Protonix Iv*)  40 mg IV DAILY [facility]


   Last Admin: 05/02/18 08:25 Dose:  40 mg


Prochlorperazine Edisylate (Compazine Inj*)  10 mg IV Q6H PRN


   PRN Reason: NAUSEA


   Last Admin: 05/01/18 10:04 Dose:  10 mg


Senna (Senokot Tab*)  1 tab PO BID [facility]


   Last Admin: 05/02/18 08:25 Dose:  1 tab


Zolpidem Tartrate (Ambien Tab*)  5 mg PO BEDTIME [facility]


   Last Admin: 05/01/18 21:16 Dose:  Not Given











Objective:


[]


 Vital Signs











Temp Pulse Resp BP Pulse Ox


 


 97.7 F   82   18   111/47   97 


 


 05/02/18 07:34  05/02/18 07:34  05/02/18 08:25  05/02/18 07:34  05/02/18 07:34














A&Ox3, EOMI, WATKINS, neuro grossly non-focal


HRR, S1S2


LS clear


+BS, abd. round and softly distended with +fluid wave, non tender and improved. 


serosanguineous fluid filling bandage


LLQ bruise extending to groin with purple and yellow decreased 


+2 MAYUR, BL


 





Assessment:


[]76 yo female with newly diagnosed locally advanced ovarian cancer now C1D2 

Carboplatin with weekly Taxol.  Again feels well today. Eating well and 

strength is improved. 





I expect that she ill have up and down day after chemotherapy but trajectory 

should be positive. Will plan discharge today to [facility] [facility]. 





Plan:


[]1. Malignant Ascites. Follow and expect drainage to stop in next day. 

Dressing changes at [facility]. 


2. Nausea. Better and will go home on Marinol, go to 2.5 mg po prn. 


3. MAYUR. Allow body to clear, will improve over time


4. Discussed when to call office including call for fever > 100.4. 


5. Anemia: improved with transfusion,follow at this time.

## 2018-05-05 NOTE — RAD
INDICATION:  Abdominal pain evaluate for small bowel obstruction.



COMPARISON:  Comparison is made with a prior x-ray study of the abdomen from April 29, 2018.



TECHNIQUE: Supine and upright  views of the abdomen were obtained.



FINDINGS: The small bowel and colon appear nondistended. No free intraperitoneal air is

seen.



There are calcifications which project over the right mid abdomen most consistent with

gallstones.



There is a left basilar infiltrate and pleural effusion present which appears unchanged.



IMPRESSION:  

1. NO EVIDENCE FOR OBSTRUCTION.

2. CHOLELITHIASIS.

3. LEFT BASILAR INFILTRATE AND PLEURAL EFFUSION, UNCHANGED.

## 2018-05-07 NOTE — PN
Progress Note





- Progress Note


Date of Service: 05/07/18


SOAP: 


Subjective:


[ She has had difficulty with confusion. Vomiting is better but not eating 

much.  Has abdominal distension but does not hurt. Wants to continue with her 

chemotherapy through fist cycle but has had a difficult week. ]











Cholecalciferol (Vitamin D Tab*)  2,000 units PO QAM [facility]


   Last Admin: 05/06/18 11:19 Dose:  2,000 units


Citalopram Hydrobromide (Celexa Tab*)  10 mg PO QAM [facility]


   Last Admin: 05/06/18 11:19 Dose:  10 mg


Docusate Sodium (Colace Cap*)  100 mg PO BID [facility]


   Last Admin: 05/06/18 20:21 Dose:  Not Given


Dronabinol (Marinol Cap*)  2.5 mg PO AC PRN


   PRN Reason: NAUSEA


   Last Admin: 05/06/18 17:56 Dose:  2.5 mg


Enoxaparin Sodium (Lovenox(*))  40 mg SUBCUT Q24H [facility]


   Last Admin: 05/06/18 20:15 Dose:  40 mg


Hydromorphone HCl (Dilaudid Inj*)  1 mg IV SLOW PU Q4H PRN


   PRN Reason: PAIN


Sodium Chloride (Ns 0.9% 1000 Ml*)  1,000 mls @ 75 mls/hr IV PER RATE [facility]


   Last Admin: 05/07/18 01:51 Dose:  75 mls/hr


Letrozole (Femara (Nf))  [facility]


   PRN Reason: Protocol


Lorazepam (Ativan Tab(*))  0.5 mg SL Q6H PRN


   PRN Reason: ANXIETY


   Last Admin: 05/06/18 23:45 Dose:  0.5 mg


Omeprazole (Prilosec Cap*)  20 mg PO DAILY [facility]


   Last Admin: 05/06/18 12:25 Dose:  20 mg


Ondansetron HCl (Zofran Odt Tab*)  4 mg PO Q6H PRN


   PRN Reason: NAUSEA


   Last Admin: 05/07/18 01:09 Dose:  4 mg


Pharmacy Profile Note (Scopolamine Patch Remove*)  1 note PATCH OFF Q72H [facility]


Polyethylene Glycol/Electrolytes (Miralax*)  17 gm PO DAILY [facility]


   Last Admin: 05/06/18 11:21 Dose:  Not Given


Prochlorperazine Edisylate (Compazine Inj*)  10 mg IV Q6H PRN


   PRN Reason: NAUSEA/VOMITING


   Last Admin: 05/07/18 06:34 Dose:  10 mg


Scopolamine (Transderm-Scop 1.5 Mg Patch*)  1 patch TRANSDERM Q72H [facility]


   PRN Reason: NAUSEA


   Last Admin: 05/05/18 02:18 Dose:  1 patch


Senna (Senokot Tab*)  1 tab PO BID [facility]


   Last Admin: 05/06/18 20:21 Dose:  Not Given











Objective:


[]


 Vital Signs











Temp Pulse Resp BP Pulse Ox


 


 98.7 F   88   17   118/58   92 


 


 05/07/18 07:18  05/07/18 07:18  05/07/18 09:00  05/07/18 07:18  05/07/18 07:18








HEENT - no oral lesions, no JVD


CTA, decrease BS


RRR S1S2


Abd: drainage from Paracentesis site. Distended, fluid and decreased BS


Ext warm and +1 edema


Neuro: confused about names but able to discuss condition. Some slurring of 

speech








Assessment:


[]77 year old with new diagnosis of ovarian cancer, day 8 of cycle 1 of 

chemotherapy Carbo and Taxol with Taxol weekly. She has continued nausea and 

vomiting at home but better over weekend. I am concerned about her abdominal 

distension and decreased BS. Ddx for nausea is SBO, ileus, chemotherapy.








Plan:


[]1. Ovarian Cancer. Taxol day 8 today


2. Nausea. 


- Will try and have paracentesis today


- XR Abd


- Add Dex 8 mg po q am


- Aloxi with Taxol and hold Zofran


3. MS change. Stop Marinol and Ativan


4. FEN. Hold fluids, will give 4 runs KCL. Check Mg


5. Discussed long term plan of treatment through cycle 1 and then decide about 

continued therapy or hospice. 


time with patient and chart 35 min.

## 2018-05-07 NOTE — RAD
Indication: Bowel obstruction follow-up. Dehydration.



Comparison: May 05, 2018



Technique: Supine and upright views of the abdomen.



Report: Unchanged moderate LEFT and small RIGHT dependent pleural effusions with basilar

atelectasis.

Central venous catheter tip at level of RIGHT atrium. 



No radiographic evidence for free intraperitoneal air.



Unremarkable bowel gas pattern. Small volume of stool visualized at the LEFT colon. 



RIGHT mid abdomen calcifications which change in orientation from supine to upright

position most consistent with cholelithiasis. 



Unremarkable soft tissue contours.



IMPRESSION: 

1. No evidence for bowel obstruction.

2. Unchanged moderate LEFT and small RIGHT dependent pleural effusions with basilar

atelectasis.

3. Cholelithiasis.
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
## 2018-05-09 NOTE — PN
Progress Note





- Progress Note


Date of Service: 05/09/18


SOAP: 


Subjective:


[]Admitted 5 days ago with dehydration and uncontrolled N/V.


C1D10 today.


Feels worse than she did yesterday.  Tells me this is "No way to live."  Feels 

her QOL is very poor and not improving.  


Feels incredibly dry but is swollen.  Chewing on ice chips and feels it is gets 

stuck in her throat and then just gags.


No mouth sores and denies difficulty with swallowing beyond discomfort to 

swallow.


Abd. distention stable, no worse and no better today.  Doesn't really feel like 

she needs a paracentesis.


Last BM yesterday, loose.





Medications:


Cholecalciferol (Vitamin D Tab*)  2,000 units PO [facility]


   Last Admin: 05/09/18 08:17 Dose:  2,000 units


Citalopram Hydrobromide (Celexa Tab*)  10 mg PO [facility]


   Last Admin: 05/09/18 08:17 Dose:  10 mg


Dexamethasone (Decadron Tab*)  8 mg PO DAILY [facility]


   Last Admin: 05/09/18 08:17 Dose:  8 mg


Docusate Sodium (Colace Cap*)  100 mg PO BID [facility]


   Last Admin: 05/09/18 08:18 Dose:  Not Given


Enoxaparin Sodium (Lovenox(*))  40 mg SUBCUT Q24H [facility]


   Last Admin: 05/08/18 20:05 Dose:  40 mg


Hydromorphone HCl (Dilaudid Inj*)  0.5 mg IV SLOW PU Q4H [facility]


   PRN Reason: PAIN


Magnesium Sulfate (Magnesium Sulf 4 Gm/100 Ml Iv*)  4,000 mg in 100 mls @ 

33.333 mls/hr IVPB ONCE ONE


   Stop: 05/09/18 14:59


Potassium Chloride 40 meq/ (Sodium Chloride)  270 mls @ 67.5 mls/hr IVPB ONCE 

ONE


   Stop: 05/09/18 15:59


   Last Admin: 05/09/18 11:50 Dose:  67.5 mls/hr


Ranitidine HCl 50 mg/ Sodium (Chloride)  52 mls @ 208 mls/hr IVPB DAILY [facility]


Letrozole (Femara (Nf))  2.5 mg PO [facility]


   PRN Reason: Protocol


   Last Admin: 05/09/18 08:16 Dose:  2.5 mg


Omeprazole (Prilosec Cap*)  20 mg PO DAILY [facility]


   Last Admin: 05/09/18 08:17 Dose:  20 mg


Ondansetron HCl (Zofran Odt Tab*)  4 mg PO Q6H [facility]


   PRN Reason: NAUSEA


   Last Admin: 05/09/18 07:39 Dose:  4 mg


Pharmacy Profile Note (Scopolamine Patch Remove*)  1 note PATCH OFF Q72H [facility]


   Last Admin: 05/08/18 03:00 Dose:  1 patch


Polyethylene Glycol/Electrolytes (Miralax*)  17 gm PO DAILY [facility]


   Last Admin: 05/09/18 08:18 Dose:  Not Given


Prochlorperazine Edisylate (Compazine Inj*)  10 mg IV Q6H [facility]


   PRN Reason: NAUSEA/VOMITING


   Last Admin: 05/09/18 09:19 Dose:  10 mg


Scopolamine (Transderm-Scop 1.5 Mg Patch*)  1 patch TRANSDERM Q72H [facility]


   PRN Reason: NAUSEA


   Last Admin: 05/08/18 20:03 Dose:  1 patch


Senna (Senokot Tab*)  1 tab PO BID [facility]


   Last Admin: 05/09/18 08:18 Dose:  Not Given





Objective:


[]


 Vital Signs











Temp Pulse Resp BP Pulse Ox


 


 98.2 F   97   20   103/59   97 


 


 05/09/18 07:40  05/09/18 07:40  05/09/18 07:40  05/09/18 07:40  05/09/18 07:40








A&Ox3, EOMI, PERRLA, notably sleepy


HRR, S1S2


LS clear with mildly dim. bases, no adventitous sounds


+BS, abd. soft and round, +fluid wave


+PP=bilat, +2 pitting edema


 Laboratory Results - last 24 hr











  05/09/18





  05:10


 


Sodium  134 L


 


Potassium  3.4 L


 


Chloride  105


 


Carbon Dioxide  24


 


Anion Gap  5


 


BUN  12


 


Creatinine  0.48 L


 


Est GFR ( Amer)  162.1


 


Est GFR (Non-Af Amer)  126.1


 


BUN/Creatinine Ratio  25.0 H


 


Glucose  106 H


 


Calcium  7.4 L


 


Magnesium  1.7 L








Assessment:


[]78 yo female with newly diagnosed locally advanced ovarian cancer now C1D10 

Carboplatin with weekly taxol admitted 5 days ago with recurrent nausea and 

vomiting and unfortunately not improving dramatically with inpt. management.  

She has however, received her second dose of Taxol 3 days ago and therefore 

today's complaints may be explainable by post tx. effect.  I discussed her tx. 

at length, reviewed expected response, however I also discussed with both her 

and her son the reality that if she did not want further therapy that is always 

an option.  They would like to hold off on any decisions regarding further tx. 

until her brother comes into town (tonight) and therefore we will re-eval. her 

stated desire today to stop therapy tomorrow AM.





Plan:


[]1. Electrolyte Imbalances: Replace K+ and Mg with IV d/t pt.'s poor tolerance 

of PO intake


2. Fatigue and lethargy: hopefully will recover after today, cont. PO dex. 

daily for now


3. Nausea: add IV famotidine





>40 min spent with pt. and son, >50% face to face counseling.

## 2018-05-10 NOTE — PN
Progress Note





- Progress Note


Date of Service: 05/10/18


SOAP: 


Subjective:


[]Markedly better today than yesterday.  Has gotten up to shower and ambulate.  

Less nausea and no further "lump in stomach."  Has not thrown up.  Still doesn'

t feel 100% and is petrified of returning to [facility] where she feels she doesn't 

have any help.  Feels IV fluids are necessary "when I get really bad."  Cont.'s 

to have poor PO intake and c/o lack of appetite rather than nausea today.  Abd. 

bloating stable to improved.  Had loose BM today.  Urine output OK.


Worried her LE edema is related to infection with hx. of knee replacements.


Curious how we will know if cancer has shrunk.


Wonders what her prognosis would be without further therapy.





Medications:


Cholecalcifer[facility] (Vitamin D Tab*)  [facility]


   Last Admin: 05/10/18 09:30 Dose:  2,000 units


Citalopram Hydrobromide (Celexa Tab*)  10 mg PO QAM [facility]


   Last Admin: 05/10/18 09:30 Dose:  10 mg


Dexamethasone (Decadron Tab*)  8 mg PO DAILY [facility]


   Last Admin: 05/10/18 09:30 Dose:  8 mg


Docusate Sodium (Colace Cap*)  100 mg PO BID [facility]


   PRN Reason: CONSTIPATION


Enoxaparin Sodium (Lovenox(*))  40 mg SUBCUT Q24H [facility]


   Last Admin: 05/09/18 20:38 Dose:  40 mg


Famotidine (Pepcid Tab*)  20 mg PO DAILY [facility]


Heparin Sodium (Porcine) (Heparin Flush Port (Ivad)**)  5 ml FLUSH DAILY [facility]


   PRN Reason: Protocol


   Last Admin: 05/10/18 10:47 Dose:  5 ml


Hydromorphone HCl (Dilaudid Inj*)  0.5 mg IV SLOW PU Q4H [facility]


   PRN Reason: PAIN


Potassium Chloride (Potassium Chloride 20 Meq/100 Ml [facility]*)  20 meq in 100 

mls @ 50 mls/hr IV ONCE ONE


   Stop: 05/10/18 14:12


Letrozole (Femara (Nf))  [facility]


   PRN Reason: Protocol


   Last Admin: 05/10/18 09:30 Dose:  2.5 mg


Loperamide HCl (Imodium Cap*)  2 mg PO .SEE DIRECTIONS PRN


   PRN Reason: DIARRHEA


Omeprazole (Prilosec Cap*)  20 mg PO DAILY [facility]


   Last Admin: 05/10/18 09:31 Dose:  20 mg


Ondansetron HCl (Zofran Odt Tab*)  4 mg PO Q6H [facility]


   PRN Reason: NAUSEA


   Last Admin: 05/10/18 06:21 Dose:  4 mg


Pharmacy Profile Note (Scopolamine Patch Remove*)  1 note PATCH OFF Q72H [facility]


   Last Admin: 05/08/18 03:00 Dose:  1 patch


Polyethylene Glycol/Electrolytes (Miralax*)  17 gm PO DAILY [facility]


   PRN Reason: CONSTIPATION


Potassium Chloride (Klor-Con Liquid*)  20 meq PO DAILY [facility]


Prochlorperazine Edisylate (Compazine Inj*)  10 mg IV Q6H [facility]


   PRN Reason: NAUSEA/VOMITING


   Last Admin: 05/09/18 09:19 Dose:  10 mg


Scopolamine (Transderm-Scop 1.5 Mg Patch*)  1 patch TRANSDERM Q72H [facility]


   PRN Reason: NAUSEA


   Last Admin: 05/08/18 20:03 Dose:  1 patch


Senna (Senokot Tab*)  1 tab PO BID [facility]


   Last Admin: 05/10/18 10:04 Dose:  Not Given





Objective:


[]


 Vital Signs











Temp Pulse Resp BP Pulse Ox


 


 99.5 F   79   18   105/56   97 


 


 05/10/18 07:52  05/10/18 07:52  05/10/18 08:00  05/10/18 07:52  05/10/18 07:52








A&Ox3, EOMI, WATKINS, neuro grossly non-focal


HRR, S1S2, no murmur noted


LS clear with even and non-labored resp. rate


+BS, abd. soft and non-tender, +fluid wave


+2 pitting edema to knees


Bilat. knees with fully healed scars, no warmth, erythema, or exudate/weeping


 Laboratory Results - last 24 hr











  05/10/18





  14:01


 


Sodium  135 L


 


Potassium  4.2


 


Chloride  104


 


Carbon Dioxide  27


 


Anion Gap  4


 


BUN  14


 


Creatinine  0.60


 


Est GFR ( Amer)  125.3


 


Est GFR (Non-Af Amer)  97.5


 


BUN/Creatinine Ratio  23.3 H


 


Glucose  130 H


 


Calcium  7.7 L


 


Magnesium  2.0


 


Total Bilirubin  0.40


 


AST  27


 


ALT  14


 


Alkaline Phosphatase  48


 


Total Protein  4.5 L


 


Albumin  2.2 L


 


Globulin  2.3


 


Albumin/Globulin Ratio  1.0











Assessment:


[]78 yo female with recently diagnosed locally advanced ovarian cancer now 

C1D12 Carboplatin with weekly Taxol admitted with dehydration, nausea, and 

vomiting.  She is much improved today, though cont.'s to feel weak and is very 

concerned about leaving the hospital to return to [facility].  She 

admits to still having some cautious feelings about pursuing cont.'d therapy.  

She has just started to tolerate PO intake and I would like to make sure she 

can manage independently before d/c.








Plan:


[]1. Consult PT/OT


2. Consult Palliative/hospice care at request of pt. re: options and discuss 

code status


3. Start PO electrolyte replacement to maintain levels


4. Case manage to consult re: goal of d/c tomorrow

## 2018-05-11 NOTE — CONSULT
Palliative / Hospice Consult


Ordering Provider: Jennifer Stephens





- Subjective


Code Status: Full Code


Advance Directives Location: In Chart


MOLST Part A Completed: Yes - DNR


Date: 05/11/18


MOLST Part E Completed:: Yes - DNI, no feeding tube


Date: 05/11/18





- History or Present Illness


History or Present Illness: 





This 77 year old woman was just diagnosed last month with metastatic ovarian 

cancer. She had been symptomatic with abdominal pain, nausea, and overwhelming 

fatigue for about 5 months. Last month she presented to the ER with ascites, 

and was found to have an ovarian mass on CT, with ascites and pleural effusion. 

She initiated chemotherapy  with Taxol and carboplatin with plans for JOYCE/BSO 

surgery after completing 3 rounds. She unfortunately continued to have symptoms 

of extreme nausea, vomiting, fatigue, and fluid retention. Her son, present 

today during the visit, says these symptoms were present even prior to the 

chemotherapy but the patient has attributed them to poor tolerance of the 

treatment regimen. She mentioned to Jennifer Stephens that she was interested in 

hospice, and so a palliative consultation was requested. The patient has a 

MOLST form from last September when she was in excellent health specifying CPR 

and mechanical ventilation. She lives at [facility], where she moved with her 

 one year ago after caring for her spouse as primary caregiver for 2 

years at home after he experienced several CVAs.


Lab Values: 


 Abnormal Lab Results











  05/10/18 05/11/18 05/11/18





  14:01 04:58 04:58


 


WBC   2.3 L 


 


RBC   2.64 L 


 


Hgb   8.2 L 


 


Hct   24 L 


 


MCV   92 


 


MCH   31 


 


MCHC   34 


 


RDW   15 


 


Plt Count   127 L 


 


MPV   7.0 L 


 


Neut % (Auto)   61.1 


 


Lymph % (Auto)   25.8 


 


Mono % (Auto)   9.8 H 


 


Eos % (Auto)   3.0 


 


Baso % (Auto)   0.3 


 


Absolute Neuts (auto)   1.4 L 


 


Absolute Lymphs (auto)   0.6 L 


 


Absolute Monos (auto)   0.2 


 


Absolute Eos (auto)   0.1 


 


Absolute Basos (auto)   0 


 


Absolute Nucleated RBC   0 


 


Nucleated RBC %   0.1 


 


Sodium  135 L   135 L


 


Potassium  4.2   3.8


 


Chloride  104   106


 


Carbon Dioxide  27   26


 


Anion Gap  4   3


 


BUN  14   13


 


Creatinine  0.60   0.51


 


Est GFR ( Amer)  125.3   151.2


 


Est GFR (Non-Af Amer)  97.5   117.6


 


BUN/Creatinine Ratio  23.3 H   25.5 H


 


Glucose  130 H   98


 


Calcium  7.7 L   7.4 L


 


Magnesium  2.0  


 


Total Bilirubin  0.40  


 


AST  27  


 


ALT  14  


 


Alkaline Phosphatase  48  


 


Total Protein  4.5 L  


 


Albumin  2.2 L  


 


Globulin  2.3  


 


Albumin/Globulin Ratio  1.0  








 Laboratory Last Values











WBC  2.3 10^3/ul (3.5-10.8)  L  05/11/18  04:58    


 


RBC  2.64 10^6/ul (4.0-5.4)  L  05/11/18  04:58    


 


Hgb  8.2 g/dl (12.0-16.0)  L  05/11/18  04:58    


 


Hct  24 % (35-47)  L  05/11/18  04:58    


 


MCV  92 fL (80-97)   05/11/18  04:58    


 


MCH  31 pg (27-31)   05/11/18  04:58    


 


MCHC  34 g/dl (31-36)   05/11/18  04:58    


 


RDW  15 % (10.5-15)   05/11/18  04:58    


 


Plt Count  127 10^3/ul (150-450)  L  05/11/18  04:58    


 


MPV  7.0 um3 (7.4-10.4)  L  05/11/18  04:58    


 


Neut % (Auto)  61.1 % (38-83)   05/11/18  04:58    


 


Lymph % (Auto)  25.8 % (25-47)   05/11/18  04:58    


 


Mono % (Auto)  9.8 % (0-7)  H  05/11/18  04:58    


 


Eos % (Auto)  3.0 % (0-6)   05/11/18  04:58    


 


Baso % (Auto)  0.3 % (0-2)   05/11/18  04:58    


 


Absolute Neuts (auto)  1.4 10^3/ul (1.5-7.7)  L  05/11/18  04:58    


 


Absolute Lymphs (auto)  0.6 10^3/ul (1.0-4.8)  L  05/11/18  04:58    


 


Absolute Monos (auto)  0.2 10^3/ul (0-0.8)   05/11/18  04:58    


 


Absolute Eos (auto)  0.1 10^3/ul (0-0.6)   05/11/18  04:58    


 


Absolute Basos (auto)  0 10^3/ul (0-0.2)   05/11/18  04:58    


 


Absolute Nucleated RBC  0 10^3/ul  05/11/18  04:58    


 


Nucleated RBC %  0.1   05/11/18  04:58    


 


Sodium  135 mmol/L (139-145)  L  05/11/18  04:58    


 


Potassium  3.8 mmol/L (3.5-5.0)   05/11/18  04:58    


 


Chloride  106 mmol/L (101-111)   05/11/18  04:58    


 


Carbon Dioxide  26 mmol/L (22-32)   05/11/18  04:58    


 


Anion Gap  3 mmol/L (2-11)   05/11/18  04:58    


 


BUN  13 mg/dL (6-24)   05/11/18  04:58    


 


Creatinine  0.51 mg/dL (0.51-0.95)   05/11/18  04:58    


 


Est GFR ( Amer)  151.2  (>60)   05/11/18  04:58    


 


Est GFR (Non-Af Amer)  117.6  (>60)   05/11/18  04:58    


 


BUN/Creatinine Ratio  25.5  (8-20)  H  05/11/18  04:58    


 


Glucose  98 mg/dL ()   05/11/18  04:58    


 


Calcium  7.4 mg/dL (8.6-10.3)  L  05/11/18  04:58    


 


Magnesium  2.0 mg/dL (1.9-2.7)   05/10/18  14:01    


 


Total Bilirubin  0.40 mg/dL (0.2-1.0)   05/10/18  14:01    


 


AST  27 U/L (13-39)   05/10/18  14:01    


 


ALT  14 U/L (7-52)   05/10/18  14:01    


 


Alkaline Phosphatase  48 U/L ()   05/10/18  14:01    


 


Total Protein  4.5 g/dL (6.4-8.9)  L  05/10/18  14:01    


 


Albumin  2.2 g/dL (3.2-5.2)  L  05/10/18  14:01    


 


Globulin  2.3 g/dL (2-4)   05/10/18  14:01    


 


Albumin/Globulin Ratio  1.0  (1-3)   05/10/18  14:01    


 


Blood Type  A Positive   05/07/18  05:40    


 


Antibody Screen  Negative   05/07/18  05:40    


 


Crossmatch  See Detail   05/07/18  05:40    














- Objective


Active Medications: 








[facility]ifer[facility] ([facility]itamin D Tab*)  [facility] [place]


   Last Admin: 05/11/18 09:14 Dose:  2,000 units


Citalopram Hydrobromide (Celexa Tab*)  10 mg PO QAM [facility]


   Last Admin: 05/11/18 09:14 Dose:  10 mg


Dexamethasone (Decadron Tab*)  8 mg PO DAILY [facility]


   Last Admin: 05/11/18 09:14 Dose:  8 mg


Docusate Sodium (Colace Cap*)  100 mg PO BID PRN


   PRN Reason: CONSTIPATION


Enoxaparin Sodium (Lovenox(*))  40 mg SUBCUT Q24H [facility]


   Last Admin: 05/10/18 20:54 Dose:  40 mg


Famotidine (Pepcid Tab*)  20 mg PO DAILY [facility]


   Last Admin: 05/11/18 09:14 Dose:  20 mg


Heparin Sodium (Porcine) (Heparin Flush Port (Ivad)**)  5 ml FLUSH DAILY [facility]


   PRN Reason: Protocol


   Last Admin: 05/11/18 09:16 Dose:  5 ml


Hydromorphone HCl (Dilaudid Inj*)  0.5 mg IV SLOW PU Q4H PRN


   PRN Reason: PAIN


Letrozole (Femara (Nf))  [facility] [place]


   [place]N Reason: Protocol


   Last Admin: 05/11/18 09:15 Dose:  2.5 mg


Loperamide HCl (Imodium Cap*)  2 mg PO .SEE DIRECTIONS PRN


   PRN Reason: DIARRHEA


Omeprazole (Prilosec Cap*)  20 mg PO DAILY [facility]


   Last Admin: 05/11/18 09:14 Dose:  20 mg


Ondansetron HCl (Zofran Odt Tab*)  4 mg PO Q6H PRN


   PRN Reason: NAUSEA


   Last Admin: 05/11/18 07:39 Dose:  4 mg


Pharmacy Profile Note (Scopolamine Patch Remove*)  1 note PATCH OFF Q72H [facility]


   Stop: 05/11/18 19:59


   Last Admin: 05/11/18 10:52 Dose:  Not Given


Pharmacy Profile Note (Scopolamine Patch Remove*)  1 note PATCH OFF Q72H [facility]


Polyethylene Glycol/Electrolytes (Miralax*)  17 gm PO DAILY PRN


   PRN Reason: CONSTIPATION


Potassium Chloride (Klor-Con Liquid*)  20 meq PO DAILY [facility]


   Last Admin: 05/11/18 09:26 Dose:  Not Given


Prochlorperazine Edisylate (Compazine Inj*)  10 mg IV Q6H PRN


   PRN Reason: NAUSEA/VOMITING


   Last Admin: 05/09/18 09:19 Dose:  10 mg


Scopolamine (Transderm-Scop 1.5 Mg Patch*)  1 patch TRANSDERM Q72H PRN


   PRN Reason: NAUSEA


   Stop: 05/11/18 19:59


   Last Admin: 05/08/18 20:03 Dose:  1 patch


Scopolamine (Transderm-Scop 1.5 Mg Patch*)  1 patch TRANSDERM Q72H PRN


   PRN Reason: NAUSEA


Senna (Senokot Tab*)  1 tab PO BID [facility]


   Last Admin: 05/11/18 09:14 Dose:  1 tab








Vital Signs: 


Vital Signs:











Temp Pulse Resp BP Pulse Ox


 


 98.5 F   85   18   109/55   95 


 


 05/11/18 08:20  05/11/18 08:20  05/11/18 09:14  05/11/18 08:20  05/11/18 08:20











Patient Weight: 


 





Weight                           150 lb








Intake and Output: 


 Intake & Output











 05/09/18 05/10/18 05/11/18 05/12/18





 06:59 06:59 06:59 06:59


 


Intake Total 1319 1025 460 


 


Output Total 200 0 0 


 


Balance 1119 1025 460 


 


Intake:    


 


  IV Fluids  415  


 


    Magnesium  105  


 


    NS (0.9%)  60  


 


    Potassium  250  


 


  IVPB 119   


 


    Potassium 119   


 


  Oral 1200 610 460 


 


Output:    


 


  Urine 200 0 0 


 


Other:    


 


  Estimated Void Small Medium Medium 


 


  # Bowel Movements 0 1 0 1


 


  Estimated Stool Amount Small Small  Medium


 


  # Voids 1 1 1 





 





ADLs: Meal  Record                                         Start:  05/04/18 19:

33


Freq:                                                      Status: Active      

  


Protocol:                                                                      

  


 Created      05/04/18 19:33  System  (Rec: 05/04/18 19:33  System  SSU-M15)


 Document     05/05/18 14:16  [id number]  (Rec: 05/05/18 14:17  [id number]  SSU-C04)


 Document     05/07/18 15:01  [id number]  (Rec: 05/07/18 15:01  [id number]  SSU-C06)


 Document     05/08/18 10:31  [id number]  (Rec: 05/08/18 10:31  [id number]  SSU-C08)


 Document     05/08/18 14:20  [id number]  (Rec: 05/08/18 14:21  [id number]  SSU-C08)


 Document     05/10/18 13:55  [id number]  (Rec: 05/10/18 13:55  [id number]  SSU-C02)


Intake and Output                                          Start:  05/04/18 19:

33


Freq:   DAILY@0600,1400,2200                               Status: Active      

  


Protocol:                                                                      

  


 Created      05/04/18 19:33  System  (Rec: 05/04/18 19:33  System  SSU-M15)


 Document     05/04/18 22:00  [id number]  (Rec: 05/04/18 22:29  [id number]  SSU-C05)


 Document     05/04/18 23:47  [id number]  (Rec: 05/04/18 23:52  [id number]  SSU-M15)


 Document     05/04/18 23:50  [id number]  (Rec: 05/05/18 01:29  [id number]  SSU-C05)


 Document     05/05/18 01:20  [id number]  (Rec: 05/05/18 01:30  [id number]  SSU-C05)


 Document     05/05/18 01:34  [id number]  (Rec: 05/05/18 01:35  [id number]  SSU-C05)


 Document     05/05/18 05:08  COJ4669  (Rec: 05/05/18 05:08  [id number]  SSU-M15)


 Document     05/05/18 05:32  [id number]  (Rec: 05/05/18 05:32  [id number]  SSU-M16)


 Document     05/05/18 05:37  SUZ6798  (Rec: 05/05/18 05:37  [id number]  SSU-M15)


 Document     05/05/18 09:58  [id number]  (Rec: 05/05/18 09:58  [id number]  SSU-C09)


 Document     05/05/18 12:26  [id number]  (Rec: 05/05/18 12:26  JMD5053  SSU-C09)


 Document     05/05/18 14:16  JYA2613  (Rec: 05/05/18 14:17  [id number]  SSU-C04)


 Document     05/05/18 16:12  [id number]  (Rec: 05/05/18 16:12  [id number]  SSU-C01)


 Document     05/05/18 23:00  [id number]  (Rec: 05/06/18 04:04  [id number]  SSU-C05)


 Document     05/06/18 05:30  KWL3075  (Rec: 05/06/18 07:18  [id number]  SSU-M14)


 Document     05/06/18 13:36  [id number]  (Rec: 05/06/18 13:37  [id number]  SSU-C05)


 Document     05/06/18 22:00  [id number]  (Rec: 05/06/18 22:26  [id number]  SSU-M17)


 Document     05/07/18 01:52  [id number]  (Rec: 05/07/18 01:52  [id number]  SSU-M14)


 Document     05/07/18 06:00  YBC9513  (Rec: 05/07/18 06:29  [id number]  SSU-M07)


 Document     05/07/18 14:00  BDU0778  (Rec: 05/07/18 15:00  [id number]  SSU-C06)


 Document     05/07/18 20:03  [id number]  (Rec: 05/07/18 20:03  VAP2652  SSU-M18)


 Document     05/07/18 22:57  FVF9793  (Rec: 05/07/18 22:57  IRX5685  SSU-M18)


 Document     05/08/18 01:59  YFQ9505  (Rec: 05/08/18 01:59  [id number]  SSU-C05)


 Document     05/08/18 03:08  SUJ0071  (Rec: 05/08/18 05:57  [id number]  SSU-M14)


 Document     05/08/18 05:47  SVJ4522  (Rec: 05/08/18 05:47  BOJ3898  SSU-M06)


 Document     05/08/18 06:29  MCH6106  (Rec: 05/08/18 06:30  KIR2825  SSU-M14)


 Document     05/08/18 10:31  MZS1352  (Rec: 05/08/18 10:31  [id number]  SSU-C08)


 Document     05/08/18 14:12  CTW2767  (Rec: 05/08/18 14:13  [id number]  SSU-C08)


 Document     05/08/18 22:29  RUO3948  (Rec: 05/08/18 22:29  ZQY2141  SSU-C19)


 Document     05/09/18 00:03  KNP1647  (Rec: 05/09/18 00:03  FBA2530  SSU-M18)


 Document     05/09/18 05:46  QEE0438  (Rec: 05/09/18 05:46  FYF8315  SSU-M14)


 Document     05/09/18 06:06  EXD5090  (Rec: 05/09/18 06:06  [id number]  SSU-M18)


 Document     05/09/18 20:11  QOF2008  (Rec: 05/09/18 20:11  [id number]  SSU-M13)


 Document     05/09/18 22:09  VII5522  (Rec: 05/09/18 22:10  [id number]  SSU-M18)


 Document     05/09/18 23:22  VVG4888  (Rec: 05/09/18 23:23  KIJ2348  SSU-M17)


 Document     05/10/18 01:46  YEH1396  (Rec: 05/10/18 01:50  AYP7031  SSU-C09)


 Document     05/10/18 04:14  MHH6188  (Rec: 05/10/18 04:14  QSY0462  SSU-M13)


 Document     05/10/18 06:23  VCW8167  (Rec: 05/10/18 06:23  [id number]  SSU-M13)


 Document     05/10/18 06:45  GEP4233  (Rec: 05/10/18 06:45  GOA6528  SSU-M17)


 Document     05/10/18 08:52  EGI3239  (Rec: 05/10/18 08:52  TBZ9502  SSU-C02)


 Document     05/10/18 14:30  XCT9172  (Rec: 05/10/18 14:35  GNR2067  SSU-C02)


 Document     05/10/18 22:39  NTU8833  (Rec: 05/10/18 22:40  SQE2462  U-M18)


 Document     05/11/18 01:24  JMU6527  (Rec: 05/11/18 01:24  [id number]  U-C12)


 Document     05/11/18 05:54  EAF3288  (Rec: 05/11/18 05:54  [id number]  U-M15)








General Impression: 





Pleasant, articulate (somewhat tangential) woman appearing fatigued but 

otherwise NAD


Head: Symmetrical


Eyes: No Scleral Icterus


Ears/Nose/Mouth/Throat: Mucous Membranes Moist


Neck: NL Appearance and Movements; NL JVP


Respiratory: Symmetrical Chest Expansion and Respiratory Effort


Abdominal: - - Abdominal distention c/w ascites


Extremities: - - 3-4+ pitting edema bilateral lower LEs.


Neurological: Alert and Oriented x 3





- Assessment


Assessment: 





We had a long discussion. The patient's nausea is fairly well controlled with 

scopolamine patch, ondansetron and prochlorperazine, but she has had recurrent 

dehydration and has needed IV hydration, and her nutritional status is 

compromised with an albumin of 2.2. However, she has actually tolerated the 

chemotherapy fairly wel, and her symptoms have been at least in part the result 

of her underlying illness. She is discouraged, but this is a transient set-back

, and she and her son and I all agreed that it makes sense for her to complete 

her last chemo treatment next Monday, and then assess her situation based on 

the follow up scans and labs. I also advised her to go ahead and have her 

surgery done for uterus and ovary removal. I assured her that we will be 

available when needed for further discussion of end-of-life issues. We did take 

the opportunity to discuss her goals of care and her advance directives, and 

she does not want CPR attempted, and she would not want mechanical ventilation 

or a feeding tube. We completed a new MOLST form to that effect. Thanks for the 

opportunity to see this very nice patient and her son Trever.





- Plan


Consult Plan (MU): Palliative





- Time On Unit


Date of Evaluation: 05/11/18


Hospice Consult Time in: 10:15


Hospice Consult Time Out: 11:30


Hospice Consult Time Total: 75


> 50% of Time Spend In Counseling or Coordinating Care: Yes

## 2018-05-11 NOTE — PN
Progress Note





- Progress Note


Date of Service: 05/11/18


SOAP: 


Subjective:


[]Had a great day yesterday, walked and ate well. Pain still present and nausea 

but better then had been. No fevers. + MAYUR, not improved. 





Seen by palliative care today. 











[facility] ([facility]itamin D [facility]*)  [facility] [place]


   Last Admin: 05/11/18 09:14 Dose:  2,000 units


Citalopram Hydrobromide (Celexa Tab*)  [facility] [place]


   Last Admin: 05/11/18 09:14 Dose:  10 mg


Dexamethasone (Decadron Tab*)  8 mg PO DAILY [facility]


   Last Admin: 05/11/18 09:14 Dose:  8 mg


Docusate Sodium (Colace Cap*)  100 mg PO BID PRN


   PRN Reason: CONSTIPATION


Enoxaparin Sodium (Lovenox(*))  40 mg SUBCUT Q24H [facility]


   Last Admin: 05/10/18 20:54 Dose:  40 mg


Famotidine (Pepcid Tab*)  20 mg PO DAILY [facility]


   Last Admin: 05/11/18 09:14 Dose:  20 mg


Heparin Sodium (Porcine) (Heparin Flush Port (Ivad)**)  5 ml FLUSH DAILY [facility]


   PRN Reason: Protocol


   Last Admin: 05/11/18 09:16 Dose:  5 ml


Hydromorphone HCl (Dilaudid Inj*)  0.5 mg IV SLOW PU Q4H [facility]


   PRN Reason: PAIN


Letrozole (Femara (Nf))  2.5 mg PO [facility]


   PRN Reason: Protocol


   Last Admin: 05/11/18 09:15 Dose:  2.5 mg


Loperamide HCl (Imodium Cap*)  2 mg PO .SEE DIRECTIONS PRN


   PRN Reason: DIARRHEA


Omeprazole (Prilosec Cap*)  20 mg PO DAILY [facility]


   Last Admin: 05/11/18 09:14 Dose:  20 mg


Ondansetron HCl (Zofran Odt Tab*)  4 mg PO Q6H [facility]


   PRN Reason: NAUSEA


   Last Admin: 05/11/18 07:39 Dose:  4 mg


Pharmacy Profile Note (Scopolamine Patch Remove*)  1 note PATCH OFF Q72H [facility]


   Stop: 05/11/18 19:59


   Last Admin: 05/11/18 10:52 Dose:  Not Given


Pharmacy Profile Note (Scopolamine Patch Remove*)  1 note PATCH OFF Q72H [facility]


Polyethylene Glycol/Electrolytes (Miralax*)  17 gm PO DAILY [facility]


   PRN Reason: CONSTIPATION


Potassium Chloride (Klor-Con Liquid*)  20 meq PO DAILY [facility]


   Last Admin: 05/11/18 09:26 Dose:  Not Given


Prochlorperazine Edisylate (Compazine Inj*)  10 mg IV Q6H [facility]N


   PRN Reason: NAUSEA/VOMITING


   Last Admin: 05/09/18 09:19 Dose:  10 mg


Scopolamine (Transderm-Scop 1.5 Mg Patch*)  1 patch TRANSDERM Q72H [facility]


   PRN Reason: NAUSEA


   Stop: 05/11/18 19:59


   Last Admin: 05/08/18 20:03 Dose:  1 patch


Scopolamine (Transderm-Scop 1.5 Mg Patch*)  1 patch TRANSDERM Q72H [facility]


   PRN Reason: NAUSEA


Senna (Senokot Tab*)  1 tab PO BID [facility]


   Last Admin: 05/11/18 09:14 Dose:  1 tab














Objective:


[]


 Vital Signs











Temp Pulse Resp BP Pulse Ox


 


 98.5 F   85   18   109/55   95 


 


 05/11/18 08:20  05/11/18 08:20  05/11/18 09:14  05/11/18 08:20  05/11/18 08:20








A&Ox3, EOMI, WATKINS, neuro grossly non-focal


HRR, S1S2, no murmur noted


LS clear with even and non-labored resp. rate


+BS, abd. soft and non-tender, +fluid wave. no additional drainage


+2 pitting edema to knees








Assessment:


[]76 yo female with recently diagnosed locally advanced ovarian cancer now 

C1D12 Carboplatin with weekly Taxol admitted with dehydration, nausea, and 

vomiting.  She continues to feel better but is very worried about going to NH 

for fear of additional decompensation. Discussed cons of being in hospital and 

need to go home as soon as able. Also, discussed prognosis and time course for 

seeing improvements from chemotherapy, measured in weeks to months. 





Plan:


[]1. Will stay in hospital today and unless additional events, d/c in am


2. Check Pre-Albumin


3. Continue Dex 8 mg po daily on discharge and will taper starting 5/16 if does 

well after chemotherapy on Monday. 


4. Continue walking, encouraged po today.

## 2018-05-12 NOTE — PN
Progress Note





- Progress Note


Date of Service: 05/12/18


SOAP: 


Subjective:


[]Had ok day yesterday but difficult night. Cough and some dry heaves. This am 

has not yet tried to eat.  No fevers. Still has low energy. Feels can go to 

[facility] today but concerned. 











Cholecalciferol (Vitamin D Tab*)  [facility] [place] [place]


   Last Admin: 05/11/18 09:14 Dose:  2,000 units


Citalopram Hydrobromide (Celexa Tab*)  10 mg PO QAM [facility]


   Last Admin: 05/11/18 09:14 Dose:  10 mg


Dexamethasone (Decadron Tab*)  8 mg PO DAILY [facility]


   Last Admin: 05/11/18 09:14 Dose:  8 mg


Docusate Sodium (Colace Cap*)  100 mg PO BID PRN


   PRN Reason: CONSTIPATION


Enoxaparin Sodium (Lovenox(*))  40 mg SUBCUT Q24H [facility]


   Last Admin: 05/11/18 21:45 Dose:  40 mg


Famotidine (Pepcid Tab*)  20 mg PO DAILY [facility]


   Last Admin: 05/11/18 09:14 Dose:  20 mg


Heparin Sodium (Porcine) (Heparin Flush Port (Ivad)**)  5 ml FLUSH DAILY [facility]


   PRN Reason: Protocol


   Last Admin: 05/12/18 01:11 Dose:  5 ml


Hydromorphone HCl (Dilaudid Inj*)  0.5 mg IV SLOW PU Q4H PRN


   PRN Reason: PAIN


Letrozole (Femara (Nf))  2.5 mg PO [facility]


   PRN Reason: Protocol


   Last Admin: 05/11/18 09:15 Dose:  2.5 mg


Loperamide HCl (Imodium Cap*)  2 mg PO .SEE DIRECTIONS PRN


   PRN Reason: DIARRHEA


   Last Admin: 05/12/18 05:05 Dose:  2 mg


Omeprazole (Prilosec Cap*)  20 mg PO DAILY [facility]


   Last Admin: 05/11/18 09:14 Dose:  20 mg


Ondansetron HCl (Zofran Odt Tab*)  4 mg PO Q6H [facility]


   PRN Reason: NAUSEA


   Last Admin: 05/12/18 00:03 Dose:  4 mg


Pharmacy Profile Note (Scopolamine Patch Remove*)  1 note PATCH OFF Q72H [facility]


   Last Admin: 05/11/18 20:48 Dose:  Not Given


Polyethylene Glycol/Electrolytes (Miralax*)  17 gm PO DAILY PRN


   PRN Reason: CONSTIPATION


Potassium Chloride (Klor-Con Liquid*)  20 meq PO DAILY [facility]


   Last Admin: 05/11/18 09:26 Dose:  Not Given


Prochlorperazine Edisylate (Compazine Inj*)  10 mg IV Q6H PRN


   PRN Reason: NAUSEA/VOMITING


   Last Admin: 05/12/18 01:11 Dose:  10 mg


Scopolamine (Transderm-Scop 1.5 Mg Patch*)  1 patch TRANSDERM Q72H PRN


   PRN Reason: NAUSEA


Senna (Senokot Tab*)  1 tab PO BID [facility]


   Last Admin: 05/11/18 21:43 Dose:  Not Given








Objective:


[]


 Vital Signs











Temp Pulse Resp BP Pulse Ox


 


 98.2 F   71   16   125/54   95 


 


 05/12/18 03:15  05/12/18 03:15  05/12/18 05:05  05/12/18 03:15  05/12/18 03:15














A&Ox3, EOMI, WATKINS, neuro grossly non-focal


HEENT- dry mucosa, no lesions, no thrush


HRR, S1S2, no murmur noted


LS clear with even and non-labored resp. rate


+BS, abd. soft and non-tender, +fluid wave. no additional drainage


+2 pitting edema to knees








Assessment:


[]78 yo female with recently diagnosed locally advanced ovarian cancer now 

C1D12 Carboplatin with weekly Taxol admitted with dehydration, nausea, and 

vomiting.  She continues to feel better but is very worried about going to NH 

for fear of additional decompensation. Did fine yesterday but difficulty 

overnight with cough and dry heaves. NO fevers. 





Plan:


[]1. Tx 1 U PRBC today


2. Plan d/c after blood and if can eat breakfast. 


3. Continue Dex 8 mg po daily on discharge and will taper starting 5/16 if does 

well after chemotherapy on Monday. 


4. Continue walking, encouraged po today.


5. Follow up Monday for chemotherapy and re-check CBC at that time

## 2018-05-12 NOTE — DS
DISCHARGE SUMMARY:

 

DATE OF ADMISSION:  05/04/18

 

DATE OF DISCHARGE:  05/12/18

 

DISCHARGE DIAGNOSES:

1.  Locally advanced ovarian cancer, 3b2.

2.  Chemotherapy.

3.  Refractory nausea.

4.  Failure to thrive.

 

HOSPITAL COURSE:  Please see details of history and physical from 05/04/18 for 
presentation.  She had been in the hospital at the end of April when she had a 
new diagnosis of ovarian cancer, locally advanced with extensive peritoneal 
metastasis. She had chemotherapy with carboplatin AUC of 6 Taxol weekly started 
during the prior hospitalization.  She went home on day 5 of chemotherapy, but 
then after 2 days at [facility] was not eating, had recurrent nausea, was unable to 
get out of bed. She came back into the hospital and is subsequently treated 
with IV fluids.  No acute infection.  She has done reasonably well over the week
, but continued with fatigue and weakness.  Appetite has improved and she has 
been eating well over the past 2 to 3 days.  She was started on dexamethasone 8 
mg p.o. q.a.m. which seems to have helped.  Blood counts have been stable, 
though she is anemic with a hemoglobin of 8.2 yesterday, ANC is 1400 yesterday 
and that has been dropping.

 

I had several discussions about staying in the hospital; however, is going back 
to [facility] with nursing home support.  We referred to get her home and avoid 
hospital- based infections as well as increased her independence.  She feels 
well enough this morning to go home.  She did have coughing and some dry heaves 
overnight and so we will await to make sure she eats breakfast before 
discharge.  Additionally, given persisted anemia and planned chemotherapy on 
Monday, we will transfuse 1 unit of packed red blood cells prior to discharge.

 

DISCHARGE MEDICATIONS:

1.  Tylenol 650 mg q.6 p.r.n.

2.  Vitamin D 2000 units a day.

3.  Celexa 10 mg q.a.m.

4.  Letrozole 2.5 mg daily.

5.  Ondansetron 4 mg q.6 p.r.n.

6.  Oxycodone 5 mg q.4 p.r.n.

7.  Dexamethasone 8 mg p.o. q.a.m. for an additional week.

8.  Imodium 2 mg p.r.n. diarrhea.

9.  Omeprazole 20 mg a day.

 

DISCHARGE PLAN:  Followup will be in 2 days in the clinic.  We will plan 
additional chemotherapy and recheck labs at that time.  She is going to call me 
over the weekend with any additional questions or concerns.

 

 [id number]/CPS #: [id number]

LAN

## 2018-06-27 NOTE — ED
Noble CASTILLO Tiffany, scribed for Dat Murillo on 06/27/18 at 0739 .





Complex/Multi-Sys Presentation





- HPI Summary


HPI Summary: 


77 year old F BIBA to [facility] complains of fever that began this morning at 07:

00. Symptoms aggravated by nothing. Symptoms alleviated by nothing. Patient 

reports cough x1.5 month with production. Denies chest pain, shortness of 

breath. Had chemo 2 days ago. Transfused yesterday.





- History Of Current Complaint


Chief Complaint: EDFever


Time Seen by Provider: 06/27/18 07:13


Hx Obtained From: Patient


Onset/Duration: Sudden Onset, Lasting Hours - 1 hour, Still Present


Timing: Constant


Aggravating Factor(s): Nothing


Alleviating Factor(s): Nothing


Associated Signs And Symptoms: Positive: Cough - x1.5 month with production.  

Negative: SOB, Chest Pain





- Allergies/Home Medications


Allergies/Adverse Reactions: 


 Allergies











Allergy/AdvReac Type Severity Reaction Status Date / Time


 


azithromycin [From Zithromax] AdvReac Severe Nausea And Verified 06/27/18 09:36





   Vomiting  











Home Medications: 


 Home Medications





Cholecalciferol TAB* [Vitamin D TAB*] 2,000 units PO DAILY 06/27/18 [History 

Confirmed 06/27/18]


Citalopram TAB* [CeleXA TAB*] 10 mg PO DAILY 06/27/18 [History Confirmed 06/27/ 18]


Dexamethasone TAB* [Decadron TAB*] 8 mg PO DAILY 06/27/18 [History Confirmed 06/ 27/18]


Enoxaparin(*) [Lovenox(*)] 90 mg SUBCUT DAILY 06/27/18 [History Confirmed 06/27/ 18]


Lactose-Reduced Food [Ensure Liquid] 237 ml PO BID 06/27/18 [History Confirmed 

06/27/18]


Letrozole (NF) [Femara (NF)] 2.5 mg PO DAILY 06/27/18 [History Confirmed 06/27/ 18]


Magnesium Oxide TAB* [MagOx 400 TAB*] 800 mg PO BID 06/27/18 [History Confirmed 

06/27/18]


Pantoprazole TAB (NF) [Protonix TAB (NF)] 20 mg PO DAILY 06/27/18 [History 

Confirmed 06/27/18]











PMH/Surg Hx/FS Hx/Imm Hx


Previously Healthy: No


Endocrine/Hematology History: 


   Denies: Hx Diabetes


Cardiovascular History: Reports: Hx Hypotension


   Denies: Hx Hypertension, Other Cardiovascular Problems/Disorders


GI History: Reports: Hx Diverticulosis, Other GI Disorders - diverticulitis new 

dx 3 months ago


 History: 


   Denies: Hx Renal Disease


Musculoskeletal History: Reports: Hx Arthritis - fingers, Other Musculoskeletal 

History - bilat knee replacement


   Denies: Hx Osteoporosis


Sensory History: Reports: Hx Contacts or Glasses - reading


   Denies: Hx Hearing Aid


Opthamlomology History: Reports: Hx Contacts or Glasses - reading


Neurological History: Reports: Hx Migraine


Psychiatric History: Reports: Hx Anxiety, Hx Depression





- Cancer History


Cancer Type, Location and Year: OVARIAN


Hx Chemotherapy: No





- Surgical History


Surgery Procedure, Year, and Place: BIOPSY RIGHT BREAST-PINNED, KNEE REPLACED 

AT [facility] 2013, RIGHT MASCTECOMY 2015, RIGHT KNEE 2017


Hx Anesthesia Reactions: No


Infectious Disease History: No


Infectious Disease History: 


   Denies: Traveled Outside the [place] in Last 30 Days





- Family History


Known Family History: 


   Negative: Blood Disorder


Family History: Arthirtis, heart disease, Cancer





- Social History


Alcohol Use: None


Hx Substance Use: No


Substance Use Type: Reports: None


Hx Tobacco Use: Yes


Smoking Status (MU): Former Smoker





Review of Systems


Positive: Fever - that began this morning at 07:00


Negative: Chest Pain


Positive: Cough - x1.5 month with production.  Negative: Shortness Of Breath


All Other Systems Reviewed And Are Negative: Yes





Physical Exam





- Summary


Physical Exam Summary: 


Appearance: Well appearing, no pain distress


Skin: warm, dry, reflects adequate perfusion


Head/face: normal


Eyes: EOMI, MARKY


ENT: dry mucous membranes


Neck: supple, non-tender


Respiratory: CTA, rhonchi present lf


Cardiovascular: RRR, pulses symmetrical  


Abdomen: non-tender, soft


Bowel: present


Musculoskeletal: normal, strength/ROM intact


Neuro: normal, sensory motor intact, A&Ox3


Triage Information Reviewed: Yes


Vital Signs On Initial Exam: 


 Initial Vitals











Pulse Resp BP Pulse Ox


 


 95   19   123/66   94 


 


 06/27/18 07:22  06/27/18 07:22  06/27/18 07:22  06/27/18 07:22











Vital Signs Reviewed: Yes





Diagnostics





- Vital Signs


 Vital Signs











  Temp Pulse Resp BP Pulse Ox


 


 06/27/18 07:33  101 F    


 


 06/27/18 07:23  101 F  95  20  123/66  94


 


 06/27/18 07:22   95  19  123/66  94














- Laboratory


Lab Results: 


 Lab Results











  06/27/18 06/27/18 06/27/18 Range/Units





  08:00 08:00 08:00 


 


WBC  1.8 L    (3.5-10.8)  10^3/ul


 


RBC  2.76 L    (4.00-5.40)  10^6/ul


 


Hgb  8.7 L    (12.0-16.0)  g/dl


 


Hct  25 L    (35-47)  %


 


MCV  90    (80-97)  fL


 


MCH  32 H    (27-31)  pg


 


MCHC  35    (31-36)  g/dl


 


RDW  19 H    (10.5-15)  %


 


Plt Count  124 L    (150-450)  10^3/ul


 


MPV  7.3 L    (7.4-10.4)  um3


 


Neut % (Auto)  39.7    (38-83)  %


 


Lymph % (Auto)  56.0 H    (25-47)  %


 


Mono % (Auto)  3.5    (0-7)  %


 


Eos % (Auto)  0.4    (0-6)  %


 


Baso % (Auto)  0.4    (0-2)  %


 


Absolute Neuts (auto)  0.7 L*    (1.5-7.7)  10^3/ul


 


Absolute Lymphs (auto)  1.0    (1.0-4.8)  10^3/ul


 


Absolute Monos (auto)  0.1    (0-0.8)  10^3/ul


 


Absolute Eos (auto)  0    (0-0.6)  10^3/ul


 


Absolute Basos (auto)  0    (0-0.2)  10^3/ul


 


Absolute Nucleated RBC  0    10^3/ul


 


Nucleated RBC %  0.8    


 


INR (Anticoag Therapy)   0.90   (0.77-1.02)  


 


APTT   25.5 L   (26.0-36.3)  seconds


 


Sodium    130 L  (135-145)  mmol/L


 


Potassium    3.9  (3.5-5.0)  mmol/L


 


Chloride    97 L  (101-111)  mmol/L


 


Carbon Dioxide    27  (22-32)  mmol/L


 


Anion Gap    6  (2-11)  mmol/L


 


BUN    19  (6-24)  mg/dL


 


Creatinine    0.58  (0.51-0.95)  mg/dL


 


Est GFR ( Amer)    122.6  (>60)  


 


Est GFR (Non-Af Amer)    101.3  (>60)  


 


BUN/Creatinine Ratio    32.8 H  (8-20)  


 


Glucose    94  ()  mg/dL


 


Lactic Acid     (0.5-2.0)  mmol/L


 


Calcium    8.4 L  (8.6-10.3)  mg/dL


 


Total Bilirubin    0.50  (0.2-1.0)  mg/dL


 


AST    66 H  (13-39)  U/L


 


ALT    182 H  (7-52)  U/L


 


Alkaline Phosphatase    87  ()  U/L


 


Troponin I    0.00  (<0.04)  ng/mL


 


C-Reactive Protein    71.14 H  (<8.01)  mg/L


 


Total Protein    5.8 L  (6.4-8.9)  g/dL


 


Albumin    2.9 L  (3.2-5.2)  g/dL


 


Globulin    2.9  (2-4)  g/dL


 


Albumin/Globulin Ratio    1.0  (1-3)  


 


Lipase    22  (11.0-82.0)  U/L


 


Urine Color     


 


Urine Appearance     


 


Urine pH     (5-9)  


 


Ur Specific Gravity     (1.010-1.030)  


 


Urine Protein     (Negative)  


 


Urine Ketones     (Negative)  


 


Urine Blood     (Negative)  


 


Urine Nitrate     (Negative)  


 


Urine Bilirubin     (Negative)  


 


Urine Urobilinogen     (Negative)  


 


Ur Leukocyte Esterase     (Negative)  


 


Urine Glucose     (Negative)  














  06/27/18 06/27/18 Range/Units





  08:00 09:10 


 


WBC    (3.5-10.8)  10^3/ul


 


RBC    (4.00-5.40)  10^6/ul


 


Hgb    (12.0-16.0)  g/dl


 


Hct    (35-47)  %


 


MCV    (80-97)  fL


 


MCH    (27-31)  pg


 


MCHC    (31-36)  g/dl


 


RDW    (10.5-15)  %


 


Plt Count    (150-450)  10^3/ul


 


MPV    (7.4-10.4)  um3


 


Neut % (Auto)    (38-83)  %


 


Lymph % (Auto)    (25-47)  %


 


Mono % (Auto)    (0-7)  %


 


Eos % (Auto)    (0-6)  %


 


Baso % (Auto)    (0-2)  %


 


Absolute Neuts (auto)    (1.5-7.7)  10^3/ul


 


Absolute Lymphs (auto)    (1.0-4.8)  10^3/ul


 


Absolute Monos (auto)    (0-0.8)  10^3/ul


 


Absolute Eos (auto)    (0-0.6)  10^3/ul


 


Absolute Basos (auto)    (0-0.2)  10^3/ul


 


Absolute Nucleated RBC    10^3/ul


 


Nucleated RBC %    


 


INR (Anticoag Therapy)    (0.77-1.02)  


 


APTT    (26.0-36.3)  seconds


 


Sodium    (135-145)  mmol/L


 


Potassium    (3.5-5.0)  mmol/L


 


Chloride    (101-111)  mmol/L


 


Carbon Dioxide    (22-32)  mmol/L


 


Anion Gap    (2-11)  mmol/L


 


BUN    (6-24)  mg/dL


 


Creatinine    (0.51-0.95)  mg/dL


 


Est GFR ( Amer)    (>60)  


 


Est GFR (Non-Af Amer)    (>60)  


 


BUN/Creatinine Ratio    (8-20)  


 


Glucose    ()  mg/dL


 


Lactic Acid  1.3   (0.5-2.0)  mmol/L


 


Calcium    (8.6-10.3)  mg/dL


 


Total Bilirubin    (0.2-1.0)  mg/dL


 


AST    (13-39)  U/L


 


ALT    (7-52)  U/L


 


Alkaline Phosphatase    ()  U/L


 


Troponin I    (<0.04)  ng/mL


 


C-Reactive Protein    (<8.01)  mg/L


 


Total Protein    (6.4-8.9)  g/dL


 


Albumin    (3.2-5.2)  g/dL


 


Globulin    (2-4)  g/dL


 


Albumin/Globulin Ratio    (1-3)  


 


Lipase    (11.0-82.0)  U/L


 


Urine Color   Yellow  


 


Urine Appearance   Cloudy  


 


Urine pH   8.0  (5-9)  


 


Ur Specific Gravity   1.018  (1.010-1.030)  


 


Urine Protein   Negative  (Negative)  


 


Urine Ketones   Negative  (Negative)  


 


Urine Blood   Negative  (Negative)  


 


Urine Nitrate   Negative  (Negative)  


 


Urine Bilirubin   Negative  (Negative)  


 


Urine Urobilinogen   Negative  (Negative)  


 


Ur Leukocyte Esterase   Negative  (Negative)  


 


Urine Glucose   Negative  (Negative)  











Result Diagrams: 


 06/27/18 08:00





 06/27/18 08:00


Lab Statement: Any lab studies that have been ordered have been reviewed, and 

results considered in the medical decision making process.





- Radiology


  ** CXR


Radiology Interpretation Completed By: Radiologist - Mild LEFT greater than 

RIGHT basilar pulmonary consolidation may represent atelectasis or pneumonia. 

Probable chronic obstructive pulmonary disease. ED physician has reviewed this 

report.





- EKG


  ** 07:36


Cardiac Rate: NL - 92 BPM


EKG Rhythm: Sinus Rhythm


EKG Interpretation: No acute changes





Complex Multi-Symp Course/Dx


Course Of Treatment: 76 y/o F BIBA to [facility] complains of fever that began this 

morning at 07:00. Bloodwork/UA obtained, showing absolute neuts 0.7. EKG is 

sinus rhythm. CXR shows pneumonia. Patient will be admitted to Dr. Garcia, 

oncology, for further evaluation.





- Diagnoses


Differential Diagnoses/HQI/PQRI: Aspiration, Sepsis, Urinary Tract Infection, 

Other - neutropenia


Provider Diagnoses: 


 Fever, Pneumonia, Sepsis, Neutropenia








- Physician Notifications


Discussed Care Of Patient With: Luz Garcia


Time Discussed With Above Provider: 09:15


Instructed by Provider To: Other - Dr. Garcia, oncology, agrees to admit 

patient.





Discharge





- Sign-Out/Discharge


Documenting (check all that apply): Discharge/Admit/Transfer - Admit





- Discharge Plan


Condition: Fair


Disposition: ADMITTED TO [facility]





- Billing Disposition and Condition


Condition: FAIR


Disposition: Admitted to [facility]





The documentation as recorded by the Noble lopez Tiffany accurately reflects 

the service I personally performed and the decisions made by Lidia winters Emmanuel.

## 2018-06-27 NOTE — RAD
Indication: Productive cough. Fever. Chemotherapy for ovarian carcinoma.



Comparison: June 13, 2018 CT chest.



Technique: Upright AP 0815 hours



Report: Elevated lung volumes. Peripheral RIGHT upper lung zone calcified granuloma

without change. Mild LEFT greater than RIGHT basilar consolidation with predominant linear

component. Negative for pleural effusion or pneumothorax. Upper normal heart size.

Unremarkable central pulmonary vasculature and mediastinal contours. Mildly tortuous

descending thoracic aorta. Tip of LEFT chest port at level of RIGHT atrium.



IMPRESSION:  Mild LEFT greater than RIGHT basilar pulmonary consolidation may represent

atelectasis or pneumonia. Probable chronic obstructive pulmonary disease.

## 2018-06-28 NOTE — PN
Progress Note





- Progress Note


Date of Service: 06/28/18


SOAP: 


Subjective:


feels pretty weak this am.  still has a very aggressive cough.  sputum with 

likely branhamella.  








Objective:


 Vital Signs











Temp Pulse Resp BP Pulse Ox


 


 100.3 F   92   20   105/55   91 


 


 06/28/18 07:39  06/28/18 07:39  06/28/18 08:00  06/28/18 07:39  06/28/18 07:39








ill appearing, warm to touch


perr eomi


op moist


rhonchi at bases bilaterally


s1 s2 nl


soft nt +Bs


no le edema


A+Ox3, nonfocal neurological exam


port clean





 Laboratory Results - last 24 hr











  06/27/18 06/27/18 06/27/18





  08:00 09:10 09:27


 


WBC   


 


RBC   


 


Hgb   


 


Hct   


 


MCV   


 


MCH   


 


MCHC   


 


RDW   


 


Plt Count   


 


MPV   


 


Neut % (Auto)   


 


Lymph % (Auto)   


 


Mono % (Auto)   


 


Eos % (Auto)   


 


Baso % (Auto)   


 


Absolute Neuts (auto)   


 


Absolute Lymphs (auto)   


 


Absolute Monos (auto)   


 


Absolute Eos (auto)   


 


Absolute Basos (auto)   


 


Absolute Nucleated RBC   


 


Nucleated RBC %   


 


Elliptocytes   


 


Acanthocytes (Spur)   


 


Sodium  130 L  


 


Potassium  3.9  


 


Chloride  97 L  


 


Carbon Dioxide  27  


 


Anion Gap  6  


 


BUN  19  


 


Creatinine  0.58  


 


Est GFR ( Amer)  122.6  


 


Est GFR (Non-Af Amer)  101.3  


 


BUN/Creatinine Ratio  32.8 H  


 


Glucose  94  


 


Lactic Acid   


 


Calcium  8.4 L  


 


Total Bilirubin  0.50  


 


AST  66 H  


 


ALT  182 H  


 


Alkaline Phosphatase  87  


 


Troponin I  0.00  


 


C-Reactive Protein  71.14 H  


 


Total Protein  5.8 L  


 


Albumin  2.9 L  


 


Globulin  2.9  


 


Albumin/Globulin Ratio  1.0  


 


Lipase  22  


 


Procalcitonin    0.1


 


Urine Color   Yellow 


 


Urine Appearance   Cloudy 


 


Urine pH   8.0 


 


Ur Specific Gravity   1.018 


 


Urine Protein   Negative 


 


Urine Ketones   Negative 


 


Urine Blood   Negative 


 


Urine Nitrate   Negative 


 


Urine Bilirubin   Negative 


 


Urine Urobilinogen   Negative 


 


Ur Leukocyte Esterase   Negative 


 


Urine Glucose   Negative 














  06/27/18 06/28/18 06/28/18





  12:25 05:19 05:19


 


WBC   1.7 L 


 


RBC   2.51 L 


 


Hgb   8.0 L 


 


Hct   23 L 


 


MCV   91 


 


MCH   32 H 


 


MCHC   35 


 


RDW   20 H 


 


Plt Count   115 L 


 


MPV   7.0 L 


 


Neut % (Auto)   29.8 L 


 


Lymph % (Auto)   65.4 H 


 


Mono % (Auto)   2.9 


 


Eos % (Auto)   0.9 


 


Baso % (Auto)   1.0 


 


Absolute Neuts (auto)   0.5 L 


 


Absolute Lymphs (auto)   1.1 


 


Absolute Monos (auto)   0 


 


Absolute Eos (auto)   0 


 


Absolute Basos (auto)   0 


 


Absolute Nucleated RBC   0 


 


Nucleated RBC %   0.2 


 


Elliptocytes   1+ 


 


Acanthocytes (Spur)   2+ 


 


Sodium    131 L


 


Potassium    3.5


 


Chloride    99 L


 


Carbon Dioxide    25


 


Anion Gap    7


 


BUN    18


 


Creatinine    0.54


 


Est GFR ( Amer)    133.2


 


Est GFR (Non-Af Amer)    110.1


 


BUN/Creatinine Ratio    33.3 H


 


Glucose    99


 


Lactic Acid  1.2  


 


Calcium    7.6 L


 


Total Bilirubin    0.60


 


AST    53 H


 


ALT    153 H


 


Alkaline Phosphatase    119 H


 


Troponin I   


 


C-Reactive Protein   


 


Total Protein    4.8 L


 


Albumin    2.5 L


 


Globulin    2.3


 


Albumin/Globulin Ratio    1.1


 


Lipase   


 


Procalcitonin   


 


Urine Color   


 


Urine Appearance   


 


Urine pH   


 


Ur Specific Gravity   


 


Urine Protein   


 


Urine Ketones   


 


Urine Blood   


 


Urine Nitrate   


 


Urine Bilirubin   


 


Urine Urobilinogen   


 


Ur Leukocyte Esterase   


 


Urine Glucose   














Acetaminophen (Tylenol Tab*)  650 mg PO Q6H PRN


   PRN Reason: fever, headache, pain


   Last Admin: 06/28/18 07:53 Dose:  650 mg


Cholecalciferol (Vitamin D Tab*)  2,000 units PO DAILY [facility]


Citalopram Hydrobromide (Celexa Tab*)  10 mg PO DAILY [facility]


Dexamethasone (Decadron Tab*)  4 mg PO DAILY [facility]


Enoxaparin Sodium (Lovenox(*))  90 mg SUBCUT DAILY [facility]


   Last Admin: 06/27/18 14:49 Dose:  90 mg


Heparin Sodium (Porcine) (Heparin Flush Port (Ivad)**)  5 ml FLUSH DAILY [facility]; 

Protocol


Cefepime HCl (Maxipime 2 Gm In Dextrose Duplex (*))  2 gm in 50 mls @ 100 mls/

hr IV Q8H [facility]


   Last Admin: 06/28/18 03:15 Dose:  100 mls/hr


Sodium Chloride (Ns 0.9% 1000 Ml*)  1,000 mls @ 75 mls/hr IV PER RATE [facility]


Letrozole (Femara (Nf))  2.5 mg PO DAILY [facility]; Protocol


Magnesium Hydroxide (Milk Of Magnesia Liq*)  30 ml PO DAILY PRN


   PRN Reason: CONSTIPATION


Magnesium Oxide (Magox 400 Tab*)  800 mg PO BID [facility]


   Last Admin: 06/27/18 19:36 Dose:  800 mg


Pantoprazole Sodium (Protonix Tab (Nf))  20 mg PO DAILY [facility]


Pharmacy Profile Note (Scopolamine Patch Remove*)  1 note PATCH OFF Q72H [facility]


Scopolamine (Transderm-Scop 1.5 Mg Patch*)  1 patch TRANSDERM Q72H [facility]


   Last Admin: 06/27/18 17:50 Dose:  1 patch














Assessment:


76 yo F w locally advanced ovarian CA on neoadjuvant carbo/taxol, sp cycle 3 

coming in now with sepsis from pneumonia in the setting of febrile neutropenia.








Plan:


Sepsis:


community acquired PNA, likely branhamella


cont cefepime


stress dose steroids for hypotension and recent dexamethasone use


recheck lactic acid


cont fluids


fu cultures


incentive spirometry





pancytopenia:


chemotherapy induced





heparin dvt prophylaxis


DNR

## 2018-06-28 NOTE — RAD
Indication: Transaminitis.



Real-time sonography of the right upper quadrant was performed.



The liver measures 16.7 cm in length. No focal lesions or intrahepatic ductal dilatation

is noted. The common duct measures 6.4 mm. The gallbladder demonstrates multiple

gallstones with multiple soft tissue polyps within the gallbladder. The largest polyp

measures 10 mm x 6 mm. Other shadowing calculi are noted.



Right kidney measures 11.1 x 4.9 x 5.6 cm. No hydronephrosis is noted.



The pancreas head, neck and proximal body demonstrates no mass or pancreatic duct

dilatation. Aorta and inferior vena cava are unremarkable.



IMPRESSION: CHOLELITHIASIS. MULTIPLE POLYPS ARE NOTED IN THE GALLBLADDER. THE LARGEST

POLYP MEASURES 0.6 X 1.0 CM.



THE LIVER IS OTHERWISE UNREMARKABLE WITH NO EVIDENCE OF BILIARY DUCT DILATATION.

## 2018-06-29 NOTE — PN
Progress Note





- Progress Note


Date of Service: 06/29/18


SOAP: 


Subjective:


[Patient reports overall feeling well.  She reports she does not understand why 

she is in the hospital and would prefer to be home at [facility].  She has been 

afebrile x 24hrs.  Remains neutropenic.  Persistent productive cough, no SOB.  

No abd pain, n/v/d.  Liver US completed for recent transaminitis which 

demonstrates gallbladder stones and polyps but without thickening or biliary 

dilitation.  ]








Objective:


[


 Laboratory Results - last 24 hr











  06/28/18 06/29/18 06/29/18





  12:45 05:20 05:20


 


WBC   1.4 L 


 


RBC   2.37 L 


 


Hgb   7.7 L 


 


Hct   22 L 


 


MCV   91 


 


MCH   33 H 


 


MCHC   36 


 


RDW   20 H 


 


Plt Count   143 L 


 


MPV   7.4 


 


Neut % (Auto)   42.5 


 


Lymph % (Auto)   52.0 H 


 


Mono % (Auto)   5.0 


 


Eos % (Auto)   0 


 


Baso % (Auto)   0.5 


 


Absolute Neuts (auto)   0.6 L 


 


Absolute Lymphs (auto)   0.7 L 


 


Absolute Monos (auto)   0.1 


 


Absolute Eos (auto)   0 


 


Absolute Basos (auto)   0 


 


Absolute Nucleated RBC   0 


 


Nucleated RBC %   0.2 


 


Sodium    138


 


Potassium    2.7 L*


 


Chloride    113 H


 


Carbon Dioxide    19 L


 


Anion Gap    6


 


BUN    11


 


Creatinine    0.34 L


 


Est GFR ( Amer)    227.1


 


Est GFR (Non-Af Amer)    187.7


 


BUN/Creatinine Ratio    32.4 H


 


Glucose    90


 


Lactic Acid  0.9  


 


Calcium    6.3 L*


 


Total Bilirubin    0.30


 


AST    24


 


ALT    88 H


 


Alkaline Phosphatase    74


 


Total Protein    4.0 L


 


Albumin    1.9 L


 


Globulin    2.1


 


Albumin/Globulin Ratio    0.9 L

















Acetaminophen (Tylenol Tab*)  650 mg PO Q6H PRN


   PRN Reason: fever, headache, pain


   Last Admin: 06/28/18 07:53 Dose:  650 mg


Cholecalciferol (Vitamin D Tab*)  2,000 units PO DAILY [facility]


   Last Admin: 06/29/18 09:42 Dose:  2,000 units


Citalopram Hydrobromide (Celexa Tab*)  10 mg PO DAILY [facility]


   Last Admin: 06/29/18 09:40 Dose:  10 mg


Enoxaparin Sodium (Lovenox(*))  90 mg SUBCUT DAILY [facility]


   Last Admin: 06/29/18 09:42 Dose:  90 mg


Heparin Sodium (Porcine) (Heparin Flush Port (Ivad)**)  5 ml FLUSH DAILY [facility]; 

Protocol


   Last Admin: 06/29/18 09:43 Dose:  Not Given


Cefepime HCl (Maxipime 2 Gm In Dextrose Duplex (*))  2 gm in 50 mls @ 100 mls/

hr IV Q8H ANDREW


   Last Admin: 06/29/18 01:58 Dose:  100 mls/hr


Sodium Chloride (Ns 0.9% 1000 Ml*)  1,000 mls @ 75 mls/hr IV PER RATE [facility]


   Last Admin: 06/29/18 02:05 Dose:  75 mls/hr


Potassium Chloride (Potassium Chloride 20 Meq/100 Ml Ivpremix*)  20 meq in 100 

mls @ 50 mls/hr IV Q2H [facility]


   Stop: 06/29/18 12:29


   Last Admin: 06/29/18 09:44 Dose:  50 mls/hr


Letrozole (Femara (Nf))  2.5 mg PO DAILY [facility]; Protocol


   Last Admin: 06/29/18 09:42 Dose:  2.5 mg


Magnesium Hydroxide (Milk Of Magnesia Liq*)  30 ml PO DAILY PRN


   PRN Reason: CONSTIPATION


Magnesium Oxide (Magox 400 Tab*)  800 mg PO BID [facility]


   Last Admin: 06/28/18 20:42 Dose:  800 mg


Pantoprazole Sodium (Protonix Tab (Nf))  40 mg PO DAILY [facility]


Pharmacy Profile Note (Scopolamine Patch Remove*)  1 note PATCH OFF Q72H [facility]


Scopolamine (Transderm-Scop 1.5 Mg Patch*)  1 patch TRANSDERM Q72H [facility]


   Last Admin: 06/27/18 17:50 Dose:  1 patch








 Vital Signs











Temp Pulse Resp BP Pulse Ox


 


 98.4 F   78   16   103/57   97 


 


 06/29/18 07:28  06/29/18 07:28  06/29/18 08:00  06/29/18 07:28  06/29/18 10:36








Exam:


Gen: relatively well appearing 78 yo female in NAD


HEENT: MMM


CV: RRR, no m/r/g


Resp: crackles at lower lung fields


Abd: soft, nonTTP


Ext: no LE edema





Assessment:


78 yo F w locally advanced ovarian CA on neoadjuvant carbo/taxol, sp cycle 3 

coming in now with sepsis from pneumonia in the setting of febrile neutropenia.








Plan:


Sepsis with neutropenic fever:


community acquired PNA, Branhamella isolated on sputum culture


not hypoxic


cont cefepime


stress dosed steroids for hypotension and recent dexamethasone use, cont 

solumedrol x 2d


blood cultures negative





Electrolyte abnormality


Hypokalemic and hypocalcemic on labs this am


Sample looks diluted


Repeat CMP and replete electrolytes as necessary





pancytopenia:


chemotherapy induced





transaminitis:


Improved


Liver US benign


Cholelithiasis without evidence of cholecystitis or biliary obstruction





heparin dvt prophylaxis


DNR]





Dispo: return to [facility] when ANC >1000

## 2018-09-09 NOTE — HP
CC:  Dr. Grijalva *

 

[facility] MEDICINE HISTORY AND PHYSICAL:

 

DATE OF ADMISSION:  18.

 

PRIMARY CARE PHYSICIAN:  Dr. Grijalva.

 

ATTENDING PHYSICIAN:  Dr. Jhon Willoughby * (dictation provided by Eusebia Kendrick NP)

 

CHIEF COMPLAINT:  Right leg pain.

 

HISTORY OF PRESENT ILLNESS:  Ms. Harley is a 78-year-old female with a past 
medical history of distant breast cancer but with a recent finding of an 
ovarian cancer, now status post hysterectomy with continued plans for 
chemotherapy with Dr. Juárez, who presents today to the hospital after a fall 
with right leg pain.  Ms. Harley states she was out yesterday getting her I-
phone fixed when she stopped at [employer] for a hamburger.  While there, she 
states that the floor was wet and she slipped and fell.  She had immediate 
severe pain to her right hip, but was able to get up and ambulate and return to 
[place] where she resides.  Today, she was evaluated and had a hip x-ray at 
[place] where it was determined that she did in fact have a right femur 
fracture. She was transitioned to the emergency room here at [facility].  The patient states she has been feeling well.  She is recovering well 
from her hysterectomy that was performed in [place].  She had transitioned to 
the nursing home at [place] on 18.  After the hysterectomy, she has not 
had any erythema or drainage from her laparoscopic sites.  She has been eating, 
tolerating oral intake well.  She has had no chest pain, no shortness of 
breath.  She states that prior to the diagnosis of ovarian cancer, she had been 
active.  She states that she has easily been able to walk up a flight of stairs 
without any shortness of breath.  She has no history of coronary artery disease 
that she is aware of.

 

In the emergency room, Ms. Harley' urinalysis shows on evidence of infection.  
Brain CT is negative.  Chest x-ray shows no evidence of infection.  EKG shows 
sinus rhythm with heart rate in the 70s and no evidence of ischemia.

 

PAST MEDICAL HISTORY:  

1.  History of ovarian cancer, status post hysterectomy with plans for further 
chemotherapy under the direction of 

Dr. Juárez.

2.  History distantly of breast cancer.

3.  Anxiety.

4.  History of PE, currently on Lovenox therapy.

 

MEDICATIONS:

1.  Robitussin AC p.r.n.

2.  Lorazepam 0.5 mg p.o. q. 6 hours p.r.n.

3.  Preparation H p.r.n.

4.  Compazine p.r.n.

5.  Magnesium oxide 800 mg p.o. b.i.d.

6.  Oxycodone 5 mg p.o. q. 4 hours p.r.n.

7.  Scopolamine patch p.r.n.

8.  Pantoprazole 20 mg p.o. daily.

9.  Zofran 4 mg p.o. q. 6 hours p.r.n.

10. Imodium p.r.n.

11. Letrozole 2.5 mg p.o. daily.

12. Lovenox 90 mg subcutaneously daily.

13. Clotrimazole troches p.r.n.

14. Celexa 10 mg p.o. daily.

15. Cholecalciferol 2000 units p.o. daily.

16. Tylenol p.r.n.

17. Mylanta p.r.n.

 

ALLERGIES:  AZITHROMYCIN.

 

FAMILY HISTORY:  The patient reports her mother had breast cancer and  
after a recurrence in her late 80s. Father had  in his early 80s related to 
heart disease.

 

SOCIAL HISTORY:  There is no report of alcohol or tobacco or drug use.  She 
lives in [place].  Her son, Trevre, is her healthcare proxy.

 

REVIEW OF SYSTEMS: A 14-point review of systems was completed with Ms. Harley, 
and all those not mentioned above were negative.

 

                               PHYSICAL EXAMINATION

 

GENERAL:  Ms. Harley is lying in the bed, she is in no acute distress.

 

VITAL SIGNS:  Temperature 99.6, pulse rate 75, respiratory rate 16, O2 
saturation 96% on room air, and blood pressure 117/60.

 

LUNGS:  Clear to auscultation bilaterally with no accessory muscle use and good 
aeration.

 

HEART:  S1 and S2.  No murmur, rub, or gallop and regular.

 

ABDOMEN:  Soft and nontender with bowel sounds positive x4.

 

EXTREMITIES:  No cyanosis, no edema.

 

SKIN:  Intact.  Abdominal laparoscopic sites well healing, no erythema, no 
drainage.

 

NEUROLOGIC:  She is alert, she is oriented x3.  She moves all extremities 
equally. There is no facial asymmetry or focal weakness.  Extraocular movements 
are intact.

 

 LABORATORY DATA:  WBC 7.2, hemoglobin 10.4, hematocrit 31, platelet count 271.
  INR 0.99.

 

Sodium 137, potassium 3.8, chloride 103, serum bicarbonate 28, BUN 15, 
creatinine 0.71, glucose 124.

 

Urine shows no evidence of infection.

 

Review of the x-ray report from [place] shows a right femoral neck fracture.  
CT brain shows no acute change, no acute abnormality today as neither does the 
chest x- ray.

 

ASSESSMENT:  Ms. Harley is a 78-year-old female with a distant history of breast 
cancer, now with recent finding of ovarian cancer, status post hysterectomy, 
with plans for likely further chemotherapy under the direction of Dr. Juárez, who 
presents today to the hospital after falling yesterday and today being found to 
have a right femoral neck fracture.  Our plans are for inpatient admission as I 
expect her length of stay to be greater than 2 days for the followin. Right hip fracture.  Dr. Fontanez was called by the emergency room physician 
and he will be providing consultation.  In the meantime, the patient will be on 
bedrest.  She will have pain medications p.r.n.  She will have bowel regimen.  
In terms of her cardiac risks, she has no known cardiac history.  She states 
that recently she was active prior to her diagnosis of ovarian cancer and has 
no chest pain or shortness of breath with any of her daily activity.  Based on 
the revised cardiac risk index, she has a 0.4 chance of untoward cardiac event 
in the perioperative period and no further cardiac testing is indicated.

2.  History of ovarian cancer.  The patient has a mild anemia today.  She has 
not recently been under chemotherapy.  The plans were for her to go for an 
appointment with Dr. Juárez tomorrow, which will need to be postponed and she can 
follow with him at the time of discharge. The patient seems to be healing well 
from her recent surgery in late August.  There is no evidence of infection with 
no erythema or drainage at the laparoscopic sites.

3.  Depression.  Continue Celexa, lorazepam.

4.  DVT prophylaxis with SCDs.

5.  History of pulmonary embolism.  Plan to hold Lovenox dose in the a.m. until 
the plan for surgery can be reviewed with Ortho.  If the surgery will be later 
tomorrow or the next day, we will resume Lovenox and hold appropriately.

6.  Code status is DNR.

7.  Disposition: To Surgical floor.

 

TIME SPENT:  Approximately 60 minutes were spent on the admission of this 
patient, more than half time spent with the patient at the bedside reviewing 
the events leading up to this hospitalization, performing the physical 
examination, and reviewing my plan of care.

 

 ____________________________________ 

EUSEBIA KENDRICK NP

 

[id number]/CPS #: [id number]

LAN

## 2018-09-09 NOTE — RAD
EXAM:

  CT Head Without Intravenous Contrast



CLINICAL HISTORY:

  78 years old, female; Injury or trauma; Fall; Initial encounter; Concussion / 

head injury; Injury date: [id number] slipped and fell on wet floor; Patient HX: 

HX of breast ca



TECHNIQUE:

  Axial computed tomography images of the head/brain without intravenous 

contrast.  All CT scans at this facility use at least one of these dose 

optimization techniques: automated exposure control; mA and/or kV adjustment 

per patient size (includes targeted exams where dose is matched to clinical 

indication); or iterative reconstruction.



COMPARISON:

  BRAIN WO CT BRAIN WO 4/15/2018 5:23 PM



FINDINGS:

  Brain:  Mild periventricular and subcortical low attenuation without adjacent 

mass effect. Small lacunar infarct right occipital lobe white matter unchanged 

from prior.  No acute ischemic changes, extra axial fluid collections, 

intraparenchymal hemorrhage, or midline shift.

  Ventricles:  The ventricles and extraventricular CSF spaces are widened 

although symmetrically positioned along the midline.

  Bones/joints:  No acute fracture.  No suspicious osseous lesions.

  Soft tissues:  Normal.

  Vasculature:  The vasculature demonstrates diffuse mild atherosclerotic 

calcification.

  Sinuses:  Large air-fluid level visualized right maxillary sinus. Mucosal 

thickening left maxillary sinus. Remaining visualized paranasal sinuses are 

clear.

  Mastoid air cells:  Normal as visualized.  No mastoid effusion.



IMPRESSION:     

  1. No traumatic intracranial abnormalities.



  2. Age-related atrophy and mild chronic small vessel ischemic disease.



  3. Right maxillary paranasal sinusitis.

## 2018-09-10 NOTE — OP
DATE OF OPERATION:  09/10/18 - ROOM #[id number]

 

DATE OF BIRTH:  [birth year]

 

ATTENDING SURGEON:  Manpreet Fontanez MD

 

ASSISTANT:  CHEMO Estrada

 

ANESTHESIA:  General endotracheal.

 

PRE-OP DIAGNOSIS:  Valgus impacted right hip fracture.

 

POST-OP DIAGNOSIS:  Valgus impacted right hip fracture.

 

OPERATIVE PROCEDURE:  Percutaneous pinning, right hip fracture.

 

INDICATIONS:  Mrs. Harley is a 78-year-old female who had fallen at [employer] 
and landed hard on the right hip.  She was able to stand and put pressure on 
the leg even though she still had pain.  That pain had continued and x-rays 
were taken at [facility], which revealed a valgus impacted right hip fracture.  She 
was sent to the emergency room yesterday evening here at [facility] and was admitted 
and underwent evaluation to make sure she was optimized for surgery today.  

I discussed with her risks of surgery such as infection, scar formation, 
stiffness, DVT, pulmonary embolism, hardware failure, and nonhealing of the 
bone.  She had previously undergone knee replacements and I discussed with her 
that this surgery will be much less than that surgery had been.  She only 
recently underwent a hysterectomy for ovarian cancer and is undergoing 
chemotherapy and will need to coordinate with Oncology about the chemotherapy 
while the fracture heals.  She does have a spot at the [facility] 
area.

 

ESTIMATED BLOOD LOSS:  Negligible.

 

COMPLICATIONS:  None.

 

DESCRIPTION OF PROCEDURE:  The patient was brought to the OR and transferred to 
the fracture table.  General endotracheal anesthesia was then established.  She 
was then placed into the traction unit, but no real traction was placed.  C-arm 
was brought in and I had nice views in both AP and lateral views of the hip.  
Right hip area was prepped and draped.  Skin over the incisional area was 
infiltrated using 10 cc of 0.5% Marcaine with 1% lidocaine with epinephrine.  
Incision was made about where the guidewire that I had sophia across the top of 
the hip showed that I would have a nice straight shot coming upwards into the 
hip.  A stab incision was made and a straight snap was placed upwards and 
spread a little bit, so I would have a tract, along which to place the 
guidewire.  Guidewire was placed and the starting position was adjusted using C-
arm guidance to avoid the light, the angle that I came up and into the head.  

I tried to come right along the medial side of the neck to try and make sure 
that we had a nice bite into her bone.  Guidewire was placed and checked in AP 
and lateral views and appeared to be in a good position.  Drill was run over 
the guidewire.  Area was measured and a short threaded screw was first called 
for.  Screw was placed using the drill for assistance to place the drill and 
this advanced the guidewire just a little bit.  Guidewire was allowed to remain
, so that I could use that as a parallel and placed the second guidewire in 
similar fashion, portion of the guidewire sticking out as measured, drill was 
run over the guidewire and a screw was placed, third screw was placed in a 
similar fashion. Guide-wires were removed and final C-arm pictures were taken 
and saved.  Wound was irrigated using a bulb syringe and a 

2-0 Vicryl was placed in subcutaneous stitches.  Skin was closed using staples. 
Sterile dressing was applied. The patient was then extubated in the OR and was 
stable on transfer to the recovery room.

 

 [id number] #: [id number]

LAN

## 2018-09-10 NOTE — CONSULT
Consult


Consult: 


CC; Right hip





HPI: 77 yo female, slipped on the wet tiles at [employer], landing on the right 

hip.  Was able to get up and WB, but continued to have right hip pain.  X-rays 

eventually done at [facility] showing an impacted right hip fx.  Was admitted 

yesterday evening.  Has been undergoing chemo for ovarian CA.





PE: no shortening of the leg, moves ankle and toes spontaneously.





A: Valgus impacted right femoral neck fx





P:  We talked about fixing the hip and how she has been through much more with 

the knee replacement.  Discussed risks of surgery such as infection, DVT (has 

had one) and not healing.  She has been declared optimized and would like to 

proceed.  On the schedule for a percutaneous pinning of the hip with cannulated 

screws.





Will need to coordinate with oncology concerning chemo and healing after 

surgery.

## 2018-09-10 NOTE — PN
Subjective


Date of Service: 09/10/18


Interval History: 





Ms. Harley reports feeling this afternoon. Her right hip pain is well 

controlled. She denies chest pain, SOB, nausea, or abdominal pain.


  





Objective


Active Medications: 





Acetaminophen (Tylenol Tab*)  650 mg PO Q6H PRN


Cholecalciferol (Vitamin D Tab*)  2,000 units PO DAILY [facility]


Citalopram Hydrobromide (Celexa Tab*)  10 mg PO DAILY [facility]


Docusate Sodium (Colace Cap*)  100 mg PO DAILY PRN


Sodium Chloride (Ns 0.9% 1000 Ml*)  1,000 mls @ 50 mls/hr IV .PER RATE [facility]


Lactated Ringer's (Lactated Ringers 1000 Ml Bag*)  1,000 mls @ 125 mls/hr IV 

PER RATE [facility]


Lidocaine/Sodium Bicarbonate (Buffered Lidocaine 0.9% Syrin*)  0.2 ml INTRADERM 

ONCE ONE


Lorazepam (Ativan Tab(*))  0.5 mg PO Q6H PRN


Magnesium Oxide (Magox 400 Tab*)  800 mg PO BID [facility]


Ondansetron HCl (Zofran Odt Tab*)  4 mg PO Q6H PRN


Oxycodone HCl (Roxycodone Tab*)  5 mg PO Q4H PRN


Pantoprazole Sodium (Protonix Tab (Nf))  20 mg PO DAILY [facility]


Pharmacy Profile Note (Scopolamine Patch Remove*)  1 note PATCH OFF Q72H PRN


Prochlorperazine (Compazine Tab*)  10 mg PO Q6H PRN


Scopolamine (Transderm-Scop 1.5 Mg Patch*)  1 patch TRANSDERM Q72H PRN


Senna (Senokot Tab*)  1 tab PO DAILY [facility]





Vital Signs:











Temp Pulse Resp BP Pulse Ox


 


 97.9 F   70   16   94/64   98 


 


 09/10/18 07:24  09/10/18 07:24  09/10/18 07:24  09/10/18 07:24  09/10/18 08:15











Oxygen Devices in Use Now: None


Appearance: Female lying in bed in NAD


Eyes: No Scleral Icterus


Ears/Nose/Mouth/Throat: Mucous Membranes Moist


Neck: Trachea Midline


Respiratory: Symmetrical Chest Expansion and Respiratory Effort, Clear to 

Auscultation


Cardiovascular: NL Sounds; No Murmurs; No JVD, No Edema


Abdominal: NL Sounds; No Tenderness; No Distention


Lymphatic: No Cervical Adenopathy


Extremities: No Edema


Skin: No Rash or Ulcers


Neurological: Alert and Oriented x 3, NL Muscle Strength and Tone


Nutrition: Taking PO's


Result Diagrams: 


 09/09/18 20:08





 09/09/18 20:08





Assess/Plan/Problems-Billing


Assessment: 





Ms. Harley is a 77 yo female with a PMH of recent diagnosis of ovarian cancer s/

p hysterectomy and PE on lovenox therapy who was admitted on 9/9/18 after a 

mechanical fall with right hip fracture.








- Patient Problems


(1) Closed right hip fracture


Comment: 


- Management per ortho.


- Pain meds prn with bowel regimen.


- PT/OT after surgery.


- Monitor H/H.   





(2) Pulmonary embolism on long-term anticoagulation therapy


Comment: 


- Lovenox on hold for therapy, will need to resume ASAP when approved per 

ortho.   





(3) Ovarian cancer


Comment: 


- Continue to follow with oncology outpatient.   





(4) DVT prophylaxis


Comment: 


- SCDs.   





(5) DNR (do not resuscitate)


Comment: 


   


Status and Disposition: 





Inpatient.  Anticipate discharge to [facility] when medically stable.

## 2018-09-10 NOTE — RAD
INDICATION:  Fall, preoperative study.



COMPARISON:  Comparison is made with a prior rib series from March 11, 2009 and a prior

chest x-ray study from June 27, 2018.



TECHNIQUE: A single portable view of the chest was obtained.



FINDINGS:   There is a power port central venous catheter entering on the left side. The

catheter tip projects over the superior vena cava. The heart is within normal limits in

size.



There is a small 3 mm calcific nodule which projects over the right upper lobe which is

unchanged from prior exams consistent with old granulomatous disease. The lungs are

otherwise clear. No pleural effusion or pneumothorax is seen.



No fracture is seen.



IMPRESSION: NO EVIDENCE FOR ACTIVE CARDIOPULMONARY DISEASE.



R0

## 2018-09-11 NOTE — PN
Progress Note





- Progress Note


Date of Service: 09/11/18


SOAP: 


Subjective:


[] Patient seen OOB in chair. She feels well and right leg pain is well 

controlled. Denies chest pain, shortness of breath, dizziness, nausea. 








Objective:


[]General: Well appearing, NAD 


RLE: Dressing CDI. Thigh is soft. Sensation intact distally. DP2+. DF/PF intact.


BL calves supple and nontender without erythema, edema or palpable cords 








Assessment:


[]POD 1 SP right femoral neck fracture, cannulated screws 








Plan:


[]50% wb RLE


PT/OT


Appreciate medical co-management. medicine will discuss care / healing/ chemo 

with oncology


Lovenox DVT prophylaxis -  home dose of 90 mg sq qd resumed 


Will plan for return to [facility] tomorrow after morning dressing change 





 Vital Signs











Temp  97.8 F   09/11/18 07:20


 


Pulse  76   09/11/18 07:20


 


Resp  16   09/11/18 07:20


 


BP  119/62   09/11/18 07:20


 


Pulse Ox  99   09/11/18 07:20








 Intake & Output











 09/10/18 09/11/18 09/11/18





 18:59 06:59 18:59


 


Intake Total 1200 2530 1478


 


Output Total 1200 1300 550


 


Balance 0 1230 928


 


Intake:   


 


  IV Fluids 900 980 973


 


    LR  980 973


 


    NS (0.9%) 250  


 


    NS 50ML, Cefazolin 2G 50  


 


    lr 600  


 


  IVPB  50 105


 


    ABX - CEFAZOLIN  50 105


 


  Oral 300 1500 400


 


Output:   


 


  Urine  450 


 


  Staples 1200 850 550


 


Other:   


 


  # Bowel Movements  0 1


 


  Estimated Stool Amount   Small


 


  Estimated Blood Loss min  





  Comment   








 Laboratory Last Values











WBC  7.3 10^3/ul (3.5-10.8)   09/11/18  06:00    


 


RBC  2.88 10^6/ul (4.00-5.40)  L  09/11/18  06:00    


 


Hgb  9.7 g/dl (12.0-16.0)  L  09/11/18  06:00    


 


Hct  29 % (35-47)  L  09/11/18  06:00    


 


MCV  99 fL (80-97)  H  09/11/18  06:00    


 


MCH  34 pg (27-31)  H  09/11/18  06:00    


 


MCHC  34 g/dl (31-36)   09/11/18  06:00    


 


RDW  15 % (10.5-15)   09/11/18  06:00    


 


Plt Count  237 10^3/ul (150-450)   09/11/18  06:00    


 


MPV  7.0 um3 (7.4-10.4)  L  09/11/18  06:00    


 


Neut % (Auto)  48.7 % (38-83)   09/09/18  20:08    


 


Lymph % (Auto)  31.0 % (25-47)   09/09/18  20:08    


 


Mono % (Auto)  12.9 % (0-7)  H  09/09/18  20:08    


 


Eos % (Auto)  6.7 % (0-6)  H  09/09/18  20:08    


 


Baso % (Auto)  0.7 % (0-2)   09/09/18  20:08    


 


Absolute Neuts (auto)  3.5 10^3/ul (1.5-7.7)   09/09/18  20:08    


 


Absolute Lymphs (auto)  2.2 10^3/ul (1.0-4.8)   09/09/18  20:08    


 


Absolute Monos (auto)  0.9 10^3/ul (0-0.8)  H  09/09/18  20:08    


 


Absolute Eos (auto)  0.5 10^3/ul (0-0.6)   09/09/18  20:08    


 


Absolute Basos (auto)  0.1 10^3/ul (0-0.2)   09/09/18  20:08    


 


Absolute Nucleated RBC  0 10^3/ul  09/09/18  20:08    


 


Nucleated RBC %  0   09/09/18  20:08    


 


INR (Anticoag Therapy)  0.99  (0.77-1.02)   09/09/18  20:08    


 


APTT  35.4 seconds (26.0-36.3)   09/09/18  20:08    


 


Sodium  138 mmol/L (135-145)   09/11/18  06:00    


 


Potassium  3.7 mmol/L (3.5-5.0)   09/11/18  06:00    


 


Chloride  104 mmol/L (101-111)   09/11/18  06:00    


 


Carbon Dioxide  28 mmol/L (22-32)   09/11/18  06:00    


 


Anion Gap  6 mmol/L (2-11)   09/11/18  06:00    


 


BUN  12 mg/dL (6-24)   09/11/18  06:00    


 


Creatinine  0.61 mg/dL (0.51-0.95)   09/11/18  06:00    


 


Est GFR ( Amer)  115.4  (>60)   09/11/18  06:00    


 


Est GFR (Non-Af Amer)  95.4  (>60)   09/11/18  06:00    


 


BUN/Creatinine Ratio  19.7  (8-20)   09/11/18  06:00    


 


Glucose  88 mg/dL ()   09/11/18  06:00    


 


Calcium  9.0 mg/dL (8.6-10.3)   09/11/18  06:00    


 


Total Bilirubin  0.30 mg/dL (0.2-1.0)   09/09/18  20:08    


 


AST  12 U/L (13-39)  L  09/09/18  20:08    


 


ALT  13 U/L (7-52)   09/09/18  20:08    


 


Alkaline Phosphatase  71 U/L ()   09/09/18  20:08    


 


Total Protein  6.0 g/dL (6.4-8.9)  L  09/09/18  20:08    


 


Albumin  3.3 g/dL (3.2-5.2)   09/09/18  20:08    


 


Globulin  2.7 g/dL (2-4)   09/09/18  20:08    


 


Albumin/Globulin Ratio  1.2  (1-3)   09/09/18  20:08    


 


Urine Color  Yellow   09/09/18  21:31    


 


Urine Appearance  Cloudy   09/09/18  21:31    


 


Urine pH  7.0  (5-9)   09/09/18  21:31    


 


Ur Specific Gravity  1.013  (1.010-1.030)   09/09/18  21:31    


 


Urine Protein  Negative  (Negative)   09/09/18  21:31    


 


Urine Ketones  Negative  (Negative)   09/09/18  21:31    


 


Urine Blood  Negative  (Negative)   09/09/18  21:31    


 


Urine Nitrate  Negative  (Negative)   09/09/18  21:31    


 


Urine Bilirubin  Negative  (Negative)   09/09/18  21:31    


 


Urine Urobilinogen  Negative  (Negative)   09/09/18  21:31    


 


Ur Leukocyte Esterase  Negative  (Negative)   09/09/18  21:31    


 


Urine Glucose  Negative  (Negative)   09/09/18  21:31    


 


Blood Type  A Positive   09/09/18  20:08    


 


Antibody Screen  Negative   09/09/18  20:08

## 2018-09-11 NOTE — RAD
INDICATION: RIGHT hip pinning.



COMPARISON: September 09, 2018



TECHNIQUE:  48.7 seconds fluoroscopy. 



FINDINGS:  Spot images document 3 cannulated lag screws traversing the subcapital femoral

neck fracture with gross anatomic alignment.



IMPRESSION: Procedural fluoroscopy.



CPT II Codes:

## 2018-09-11 NOTE — PN
Subjective


Date of Service: 09/11/18


Interval History: 





Ms. Harley reports feeling well today and is up ambulating with physical 

therapy.  She denies chest pain, SOB, nausea, or abdominal pain.  Her hip pain 

is reasonably controlled.











Objective


Active Medications: 





Acetaminophen (Tylenol Tab*)  650 mg PO Q6H PRN


Cholecalciferol (Vitamin D Tab*)  2,000 units PO DAILY [facility]


Citalopram Hydrobromide (Celexa Tab*)  10 mg PO DAILY [facility]


Docusate Sodium (Colace Cap*)  100 mg PO DAILY PRN


Enoxaparin Sodium (Lovenox(*))  40 mg SUBCUT DAILY@1200 [facility]


Lactated Ringer's (Lactated Ringers 1000 Ml Bag*)  1,000 mls @ 125 mls/hr IV 

PER RATE [facility]


Cefazolin Sodium/Dextrose (Kefzol 1 Gm In Dextrose Duplex (*))  1 gm in 50 mls 

@ 200 mls/hr IVPB Q8H [facility]


Influenza Virus Vaccine (Fluarix *Quad* 2018-19*)  0.5 ml IM .ONCE ONE


Lorazepam (Ativan Tab(*))  0.5 mg PO Q6H PRN


Magnesium Oxide (Magox 400 Tab*)  800 mg PO BID [facility]


Morphine Sulfate (Morphine Inj ((Syringe))*)  2 mg IV Q1H PRN


Naloxone HCl (Narcan*)  0.08 mg IV Q2M PRN


Ondansetron HCl (Zofran Odt Tab*)  4 mg PO Q6H PRN


Oxycodone HCl (Roxycodone Tab*)  5 mg PO Q4H PRN


Pantoprazole Sodium (Protonix Tab (Nf))  40 mg PO DAILY [facility]


Pharmacy Profile Note (Scopolamine Patch Remove*)  1 note PATCH OFF Q72H PRN


Prochlorperazine (Compazine Tab*)  10 mg PO Q6H PRN


Scopolamine (Transderm-Scop 1.5 Mg Patch*)  1 patch TRANSDERM Q72H PRN


Senna (Senokot Tab*)  1 tab PO DAILY [facility]





Vital Signs:











Temp Pulse Resp BP Pulse Ox


 


 97.8 F   76   16   119/62   99 


 


 09/11/18 07:20  09/11/18 07:20  09/11/18 07:20  09/11/18 07:20  09/11/18 07:20











Oxygen Devices in Use Now: None


Appearance: Female lying in bed in NAD


Eyes: No Scleral Icterus


Ears/Nose/Mouth/Throat: Mucous Membranes Moist


Neck: Trachea Midline


Respiratory: Symmetrical Chest Expansion and Respiratory Effort, Clear to 

Auscultation


Cardiovascular: NL Sounds; No Murmurs; No JVD, No Edema


Abdominal: NL Sounds; No Tenderness; No Distention


Lymphatic: No Cervical Adenopathy


Extremities: No Edema


Neurological: Alert and Oriented x 3, NL Muscle Strength and Tone


Nutrition: Taking PO's


Result Diagrams: 


 09/11/18 06:00





 09/11/18 06:00





Assess/Plan/Problems-Billing


Assessment: 





Ms. Harley is a 79 yo female with a PMH of recent diagnosis of ovarian cancer s/

p hysterectomy and PE on lovenox therapy who was admitted on 9/9/18 after a 

mechanical fall with right hip fracture.








- Patient Problems


(1) Closed right hip fracture


Comment: 


- Progressing well.


- Management per ortho.


- Pain meds prn with bowel regimen.


- PT/OT after surgery.


- Monitor H/H.   





(2) Pulmonary embolism on long-term anticoagulation therapy


Comment: 


- Resume full dose lovenox.   





(3) Ovarian cancer


Comment: 


- Continue to follow with oncology outpatient.


- Oncology team aware that patient was admitted for hip fracture and had 

surgery.  Patient will follow up with them in one week.   





(4) DVT prophylaxis


Comment: 


-Lovenox   





(5) DNR (do not resuscitate)


Comment: 


   


Status and Disposition: 





Inpatient.  Anticipate discharge to [place] when medically stable.

## 2018-09-12 NOTE — PN
Subjective


Date of Service: 09/12/18


Interval History: 





Ms. Harley denies complaint and is eager for discharge back to [facility].








Objective


Active Medications: 





Acetaminophen (Tylenol Tab*)  650 mg PO Q6H PRN


Cholecalciferol (Vitamin D Tab*)  2,000 units PO DAILY [facility]


Citalopram Hydrobromide (Celexa Tab*)  10 mg PO DAILY [facility]


Docusate Sodium (Colace Cap*)  100 mg PO DAILY PRN


Enoxaparin Sodium (Lovenox(*))  90 mg SUBCUT DAILY@1200 [facility]


Lactated Ringer's (Lactated Ringers 1000 Ml Bag*)  1,000 mls @ 125 mls/hr IV 

PER RATE [facility]


Lorazepam (Ativan Tab(*))  0.5 mg PO Q6H PRN


Magnesium Oxide (Magox 400 Tab*)  800 mg PO BID [facility]


Morphine Sulfate (Morphine Inj ((Syringe))*)  2 mg IV Q1H PRN


Naloxone HCl (Narcan*)  0.08 mg IV Q2M PRN


Ondansetron HCl (Zofran Odt Tab*)  4 mg PO Q6H PRN


Oxycodone HCl (Roxycodone Tab*)  5 mg PO Q4H PRN


Pantoprazole Sodium (Protonix Tab (Nf))  40 mg PO DAILY [facility]


Pharmacy Profile Note (Scopolamine Patch Remove*)  1 note PATCH OFF Q72H PRN


Polyethylene Glycol/Electrolytes (Miralax*)  17 gm PO DAILY PRN


Prochlorperazine (Compazine Tab*)  10 mg PO Q6H PRN


Scopolamine (Transderm-Scop 1.5 Mg Patch*)  1 patch TRANSDERM Q72H PRN


Senna (Senokot Tab*)  1 tab PO DAILY [facility]





Vital Signs:











Temp Pulse Resp BP Pulse Ox


 


 97.8 F   75   18   109/54   99 


 


 09/12/18 07:48  09/12/18 07:48  09/12/18 08:00  09/12/18 07:48  09/12/18 08:00











Oxygen Devices in Use Now: None


Appearance: Elderly female lying in bed in NAD


Eyes: No Scleral Icterus


Ears/Nose/Mouth/Throat: Mucous Membranes Moist


Neck: Trachea Midline


Respiratory: Symmetrical Chest Expansion and Respiratory Effort, Clear to 

Auscultation


Cardiovascular: NL Sounds; No Murmurs; No JVD, No Edema


Abdominal: NL Sounds; No Tenderness; No Distention


Lymphatic: No Cervical Adenopathy


Extremities: No Edema


Skin: No Rash or Ulcers


Neurological: Alert and Oriented x 3, NL Muscle Strength and Tone


Nutrition: Taking PO's


Result Diagrams: 


 09/11/18 06:00





 09/11/18 06:00





Assess/Plan/Problems-Billing


Assessment: 





Ms. Harley is a 79 yo female with a PMH of recent diagnosis of ovarian cancer s/

p hysterectomy and PE on lovenox therapy who was admitted on 9/9/18 after a 

mechanical fall with right hip fracture.








- Patient Problems


(1) Closed right hip fracture


Comment: 


- Progressing well.


- Management per ortho.


- Pain meds prn with bowel regimen.   





(2) Pulmonary embolism on long-term anticoagulation therapy


Comment: 


- Resume full dose lovenox.   





(3) Ovarian cancer


Comment: 


- Continue to follow with oncology outpatient.


- Oncology team aware that patient was admitted for hip fracture and had 

surgery.  Patient will follow up with them in one week.   





(4) DVT prophylaxis


Comment: 


-Lovenox   





(5) DNR (do not resuscitate)


Comment: 


   


Status and Disposition: 





Inpatient.  Discharge to [facility].

## 2018-09-12 NOTE — PN
Progress Note





- Progress Note


Date of Service: 09/12/18


SOAP: 


Subjective:


[]Patient seen and examined at bedside. She feels well without right leg pain, 

chest pain, shortness of breath, dizziness or nausea. She has been living at 

[facility] for the past 6 months and desires D/C back there today. 





Objective:


[]General: Well appearing, NAD 


RLE: Dressing changed, incision CDI without surrounding erythema and without 

discharge. Thigh is soft. Sensation intact distally. DP2+. DF/PF intact.


BL calves supple and nontender without erythema, edema or palpable cords 





Assessment:


[]S/P right femoral neck fracture, cannulated screws Dr. Fontanez





Plan:


[] Plan for return to [place] today


Continue PT/OT


Appreciate medical co-management. Patient has a follow up with oncology next 

week, provider aware of fracture and fixation.


Lovenox DVT prophylaxis -  home dose of 90 mg sq qd 


Follow up in orthopedic office 2 weeks post op, staple removal at this time


Daily dressing change with antibiotic ointment, gauze and tape 


May shower POD 3, do not submerge wound


50% WB RLE





 Vital Signs











Temp  97.8 F   09/12/18 07:48


 


Pulse  75   09/12/18 07:48


 


Resp  18   09/12/18 08:00


 


BP  109/54   09/12/18 07:48


 


Pulse Ox  99   09/12/18 08:00








 Intake & Output











 09/11/18 09/12/18 09/12/18





 18:59 06:59 18:59


 


Intake Total 1928 1150 450


 


Output Total 1050 1200 400


 


Balance 878 -50 50


 


Intake:   


 


  IV Fluids 973  


 


      


 


  IVPB 105  


 


    ABX - CEFAZOLIN 105  


 


  Oral 850 1150 450


 


Output:   


 


  Urine 500 1200 400


 


  Staples 550  


 


Other:   


 


  # Bowel Movements 1  


 


  Estimated Stool Amount Small  








 Laboratory Last Values











WBC  7.3 10^3/ul (3.5-10.8)   09/11/18  06:00    


 


RBC  2.88 10^6/ul (4.00-5.40)  L  09/11/18  06:00    


 


Hgb  9.7 g/dl (12.0-16.0)  L  09/11/18  06:00    


 


Hct  29 % (35-47)  L  09/11/18  06:00    


 


MCV  99 fL (80-97)  H  09/11/18  06:00    


 


MCH  34 pg (27-31)  H  09/11/18  06:00    


 


MCHC  34 g/dl (31-36)   09/11/18  06:00    


 


RDW  15 % (10.5-15)   09/11/18  06:00    


 


Plt Count  237 10^3/ul (150-450)   09/11/18  06:00    


 


MPV  7.0 um3 (7.4-10.4)  L  09/11/18  06:00    


 


Neut % (Auto)  48.7 % (38-83)   09/09/18  20:08    


 


Lymph % (Auto)  31.0 % (25-47)   09/09/18  20:08    


 


Mono % (Auto)  12.9 % (0-7)  H  09/09/18  20:08    


 


Eos % (Auto)  6.7 % (0-6)  H  09/09/18  20:08    


 


Baso % (Auto)  0.7 % (0-2)   09/09/18  20:08    


 


Absolute Neuts (auto)  3.5 10^3/ul (1.5-7.7)   09/09/18  20:08    


 


Absolute Lymphs (auto)  2.2 10^3/ul (1.0-4.8)   09/09/18  20:08    


 


Absolute Monos (auto)  0.9 10^3/ul (0-0.8)  H  09/09/18  20:08    


 


Absolute Eos (auto)  0.5 10^3/ul (0-0.6)   09/09/18  20:08    


 


Absolute Basos (auto)  0.1 10^3/ul (0-0.2)   09/09/18  20:08    


 


Absolute Nucleated RBC  0 10^3/ul  09/09/18  20:08    


 


Nucleated RBC %  0   09/09/18  20:08    


 


INR (Anticoag Therapy)  0.99  (0.77-1.02)   09/09/18  20:08    


 


APTT  35.4 seconds (26.0-36.3)   09/09/18  20:08    


 


Sodium  138 mmol/L (135-145)   09/11/18  06:00    


 


Potassium  3.7 mmol/L (3.5-5.0)   09/11/18  06:00    


 


Chloride  104 mmol/L (101-111)   09/11/18  06:00    


 


Carbon Dioxide  28 mmol/L (22-32)   09/11/18  06:00    


 


Anion Gap  6 mmol/L (2-11)   09/11/18  06:00    


 


BUN  12 mg/dL (6-24)   09/11/18  06:00    


 


Creatinine  0.61 mg/dL (0.51-0.95)   09/11/18  06:00    


 


Est GFR ( Amer)  115.4  (>60)   09/11/18  06:00    


 


Est GFR (Non-Af Amer)  95.4  (>60)   09/11/18  06:00    


 


BUN/Creatinine Ratio  19.7  (8-20)   09/11/18  06:00    


 


Glucose  88 mg/dL ()   09/11/18  06:00    


 


Calcium  9.0 mg/dL (8.6-10.3)   09/11/18  06:00    


 


Total Bilirubin  0.30 mg/dL (0.2-1.0)   09/09/18  20:08    


 


AST  12 U/L (13-39)  L  09/09/18  20:08    


 


ALT  13 U/L (7-52)   09/09/18  20:08    


 


Alkaline Phosphatase  71 U/L ()   09/09/18  20:08    


 


Total Protein  6.0 g/dL (6.4-8.9)  L  09/09/18  20:08    


 


Albumin  3.3 g/dL (3.2-5.2)   09/09/18  20:08    


 


Globulin  2.7 g/dL (2-4)   09/09/18  20:08    


 


Albumin/Globulin Ratio  1.2  (1-3)   09/09/18  20:08    


 


Urine Color  Yellow   09/09/18  21:31    


 


Urine Appearance  Cloudy   09/09/18  21:31    


 


Urine pH  7.0  (5-9)   09/09/18  21:31    


 


Ur Specific Gravity  1.013  (1.010-1.030)   09/09/18  21:31    


 


Urine Protein  Negative  (Negative)   09/09/18  21:31    


 


Urine Ketones  Negative  (Negative)   09/09/18  21:31    


 


Urine Blood  Negative  (Negative)   09/09/18  21:31    


 


Urine Nitrate  Negative  (Negative)   09/09/18  21:31    


 


Urine Bilirubin  Negative  (Negative)   09/09/18  21:31    


 


Urine Urobilinogen  Negative  (Negative)   09/09/18  21:31    


 


Ur Leukocyte Esterase  Negative  (Negative)   09/09/18  21:31    


 


Urine Glucose  Negative  (Negative)   09/09/18  21:31    


 


Blood Type  A Positive   09/09/18  20:08    


 


Antibody Screen  Negative   09/09/18  20:08

## 2018-09-12 NOTE — DS
CC:  Dr. Grijalva *

 

[facility] MEDICINE DISCHARGE SUMMARY:

 

DATE OF ADMISSION:  09/09/18

 

DATE OF DISCHARGE:  09/12/18

 

PRIMARY CARE PHYSICIAN:  Dr. Grijalva.

 

ATTENDING PHYSICIAN:  Dr. Eduarda Salas * (dictation provided by Eusebia Kendrick NP
)

 

PRIMARY DIAGNOSIS:  Impacted right hip fracture.

 

SECONDARY DIAGNOSES:

1.  History of ovarian cancer, status post hysterectomy with plans for further 
chemotherapy under the direction of Dr. Juárez.

2.  History distantly of breast cancer.

3.  Anxiety.

4.  History of pulmonary embolism, currently on Lovenox therapy.

 

MEDICATIONS:  At the time of discharge are:

1.  Mylanta 1 suspension p.o. daily p.r.n.

2.  Robitussin A-C 5 mL p.o. b.i.d.

3.  Lorazepam 0.5 mg p.o. q.6 hours p.r.n.

4.  Compazine 10 mg p.o. q.6 hours p.r.n.

5.  Magnesium oxide 800 mg p.o. b.i.d.

6.  Oxycodone 5 mg p.o. q.4 hours p.r.n.

7.  Scopolamine patch p.r.n.

8.  Pantoprazole 20 mg p.o. daily.

9.  Ondansetron 4 mg p.o. p.r.n.

10.  Imodium 2 mg p.r.n.

11.  Letrozole 2.5 mg p.o. daily.

12.  Lovenox 90 mg subcutaneously daily.

13.  Clotrimazole yandy p.r.n.

14.  Citalopram 10 mg p.o. daily.

15.  Cholecalciferol 2000 units p.o. daily.

16.  Tylenol 650 mg p.o. q.6 hours p.r.n.

17.  MiraLAX p.r.n.

18.  Docusate p.r.n.

 

HOSPITAL COURSE:  Ms. Harley is a 78-year-old female with past medical history 
of recent diagnosis of ovarian cancer, status post hysterectomy, currently 
undergoing treatment with Dr. Juárez, who presented to the hospital on 09/09/18 
after a fall. Please see the dictated H and P from myself for complete details.
  In brief, the patient had been out the day before when she slipped and fell 
and had immediate pain to her right side.  She was, however, able to get up and 
return to [facility] where she resides and the following day, she was evaluated 
with an x-ray of the hip at [facility] and was found to have a right femur fracture.

 

Ms. Harley was transitioned to the hospital.  Her workup included a chest x-ray, 
which showed "no evidence for acute cardiopulmonary disease."  She had an EKG, 
which showed a sinus rhythm, the heart rate of about 70 and no evidence of 
ischemia.  Her labs were unremarkable.  She has chronic underlying anemia that 
was at baseline.  She had no evidence of a urinary tract infection with a 
negative UA. Her vitals are stable.  She also had a CT of the brain because of 
the fall that showed "no traumatic intracranial abnormalities, age-related 
atrophy, and mild chronic small vessel ischemic disease, and right maxillary 
paranasal sinusitis."

 

Ms. Harley was seen in consultation by Dr. Fontanez on 09/10/18 and he 
recommended that the patient go on for percutaneous pinning with cannulated 
screws.  This was performed on 09/10/18 and the patient tolerated that 
procedure well.

 

Ms. Harley has been doing quite well in the postoperative period.  Her vitals 
have been stable.  Her anemia has been stable.  She has been up and ambulating 
with physical therapy.  She has been resumed on her home Lovenox for her 
history of DVT with approval for per Orthopedic Surgery.

 

Ms. Harley is medically stable for discharge to home.  She will be following up 
with Dr. Grijalva per routine at the nursing home at [facility] and she will also be 
following up with Dr. Fontanez and his team within 2 weeks.  In addition, the 
patient has been following with Dr. Juárez for her diagnosis of ovarian cancer 
and she has an appointment for followup there, 09/27/18, at 8:40 a.m.

 

DISPOSITION:  To [facility].

 

DIET:  Regular.

 

ACTIVITY:  50% weightbearing on the operative leg.

 

FOLLOWUP PLANS:

1.  Please follow up with Dr. Grijalva per routine at [facility].

2.  Please follow up with Dr. Juárez on 09/27/18 at 8:40 a.m.

3.  Please follow up with Dr. Fontanez within 2 weeks.

 

TIME SPENT:  Approximately 60 minutes was spent on the discharge of this patient
, more than half the time was spent with the patient at the bedside reviewing 
the events leading up to this hospitalization, performing the physical 
examination, and reviewing my plan of care.

 

____________________________________ 

EUSEBIA KENDRICK NP

 

[id number]/CPS #: [id number]

LAN

## 2018-11-05 NOTE — HP
CC:  Dr. Grijalva; Dr. Juárez; Dr. Andrew Echevarria

 

ADMITTING HISTORY AND PHYSICAL:

 

DATE OF ADMISSION:  11/06/18

 

ADMITTING DIAGNOSES:

1.  Advanced ovarian cancer.

2.  Pelvic mass.

3.  Left hydronephrosis.

 

PLANNED PROCEDURE:  Left retrograde, left stent insertion, possible balloon dilatation.

 

SURGEON:  Dr. Echevarria.

 

HISTORY OF PRESENT ILLNESS:  Megan Harley is a 78-year-old lady with stage III ovarian cancer (carcin
osarcoma), who had initially presented with abdominal bloating and discomfort.  She has been treated 
with chemotherapy and surgery and has a large pelvic mass noted recently on the ultrasound and also o
n the CT scan with left hydronephrosis.  The mass is in the area of the left ureterovesical junction 
impinging on the left ureter at that point.

 

PAST MEDICAL HISTORY:  Significant for:

 

1.  Ovarian cancer.

2.  History of right breast cancer, originally diagnosed in 1995, with a recurrence in 2015.

 

PAST SURGICAL HISTORY:  Significant for bilateral total knee replacements, right lumpectomy in 1995, 
right mastectomy in 2015, and total abdominal hysterectomy and oophorectomy in August of 2018.

 

MEDICATIONS ON ADMISSION:

1.  Citalopram 10 mg daily.

2.  Compazine 10 mg 4 times a day p.r.n.

3.  Lovenox daily.

4.  Letrozole 2.5 mg daily.

5.  Zofran 4 mg q.4 hours p.r.n. nausea.

6.  Oxycodone 5 mg p.r.n. q.4 to 6 hours.

7.  Protonix 20 mg daily.

8.  Scopolamine patch 72 hours transdermal.

8.  Vitamin D 2000 units capsule daily.

 

ALLERGIES AND INTOLERANCE:  AZITHROMYCIN.

 

REVIEW OF SYSTEMS:  She denies any chest pain or shortness of breath.  There is no history of diabete
s mellitus or any other major systemic illness.

 

                               PHYSICAL EXAMINATION

 

GENERAL:  Reveals a pleasant, elderly lady.

 

VITAL SIGNS:  Blood pressure is 100/64, pulse 83 per minute and regular, temperature 96.6, oxygen sat
uration 98% on room air.

 

LUNGS:  Clear bilaterally.

 

CARDIOVASCULAR:  Regular rate and rhythm.  S1, S2.

 

ABDOMEN:  Soft with left flank tenderness.

 

 IMPRESSION AND PLAN:  I reviewed the imaging studies including the CT scan and the ultrasound and hernández
d a detailed discussion with Mrs. Harley and her family member regarding the findings of left hydronep
hrosis and the proximity of the large pelvic mass to the bladder and to the distal left ureter.

 

I explained that it is possible that if the mass is putting too much pressure on the left ureter, it 
may not be feasible to get a left stent in and she may require placement of a left percutaneous nephr
ostomy tube.

 

All of her questions were answered in detail.  The plan is for left retrograde, left stent insertion,
 possible balloon dilatation.

 

 

 

[id number]/[facility] #: [id number]

## 2018-11-06 ENCOUNTER — HOSPITAL ENCOUNTER (OUTPATIENT)
Dept: HOSPITAL 25 - OR | Age: 78
Discharge: HOME | End: 2018-11-06
Attending: UROLOGY
Payer: MEDICARE

## 2018-11-06 VITALS — DIASTOLIC BLOOD PRESSURE: 51 MMHG | SYSTOLIC BLOOD PRESSURE: 96 MMHG

## 2018-11-06 DIAGNOSIS — Z79.01: ICD-10-CM

## 2018-11-06 DIAGNOSIS — K57.92: ICD-10-CM

## 2018-11-06 DIAGNOSIS — N13.1: Primary | ICD-10-CM

## 2018-11-06 DIAGNOSIS — C56.9: ICD-10-CM

## 2018-11-06 DIAGNOSIS — Z86.711: ICD-10-CM

## 2018-11-06 DIAGNOSIS — C79.89: ICD-10-CM

## 2018-11-06 PROCEDURE — C1876 STENT, NON-COA/NON-COV W/DEL: HCPCS

## 2018-11-06 PROCEDURE — 74420 UROGRAPHY RTRGR +-KUB: CPT

## 2018-11-07 NOTE — OP
CC:  Dr. Grijalva; Dr. Juárez *

 

DATE OF OPERATION:  11/06/18 - [facility]

 

DATE OF BIRTH:  [birth year]

 

SURGEON:  Andrew Echevarria MD

 

ANESTHESIOLOGIST:  Dr. Gilmore.

 

ANESTHESIA:  General.

 

PRE-OP DIAGNOSES:

1.  Left hydronephrosis.

2.  Large pelvic mass.

 

POST-OP DIAGNOSES:

1.  Left hydronephrosis.

2.  Large pelvic mass.

 

OPERATIVE PROCEDURE:  Cystoscopy, left retrograde pyelogram, left ureteral 
balloon dilatation and left stent insertion.

 

COMPLICATIONS:  None.

 

POSTOPERATIVE CONDITION:  Stable.

 

STENTS USED:  8.5-Barbadian 28 cm silicon stent, left ureter.

 

INDICATIONS:  Megan Harley is a 78-year-old lady with advanced pelvic 
malignancy causing left hydronephrosis and hydroureter.

 

OPERATIVE FINDINGS:

1.  Large pelvic mass impinging on bladder and distal left ureter.

2.  Left hydronephrosis and proximal hydroureter.

 

DESCRIPTION OF PROCEDURE:  After induction of general anesthesia, the patient 
was placed in dorsal lithotomy position.  Sequential compression devices were 
in place and functioning.  Initial cystoscopy revealed an extremely distorted 
bladder with large mass causing extensive pressure on the posterior bladder 
wall predominantly on the left side of the bladder.  The left orifice was 
identified, a guide wire was introduced and after some initial maneuvering, was 
advanced proximally.  Retrograde pyelogram revealed significant left 
hydronephrosis with a dilated tortuous left ureter.  Balloon dilatation of the 
entire mid and distal left ureter was successfully carried out under 
fluoroscopy.  Next, an 8.5 Barbadian 28 cm silicon stent was introduced and 
positioned under fluoroscopy with good proximal and distal positioning 
obtained.  A Staples was placed for temporary bladder drainage.  The patient 
tolerated the procedure satisfactorily and was transferred back to the recovery 
area in stable condition.

 

 [id number]/[facility] #: [id number]

[facility]
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
## 2021-10-04 NOTE — PN
Final Anesthesia Post-op Assessment    Patient: Tomy Sifuentes  Procedure(s) Performed: IR ANGIOGRAM EXTREMITY RIGHT  Anesthesia type: MAC    Vitals Value Taken Time   Temp 97.2 10/04/21 1117   Pulse 54 10/04/21 1116   Resp 12 10/04/21 1117   SpO2 94 % 10/04/21 1116   /72 10/04/21 1114   Vitals shown include unvalidated device data.      Patient Location: Phase II  Post-op Vital Signs:stable  Level of Consciousness: awake, participates in exam, oriented and alert  Respiratory Status: spontaneous ventilation and unassisted  Cardiovascular blood pressure returned to baseline  Hydration: euvolemic  Pain Management: well controlled  Handoff: Handoff to receiving clinician was performed and questions were answered  Vomiting: none  Nausea: None  Airway Patency:patent  Post-op Assessment: awake, alert, appropriately conversant, or baseline, no complications, patient tolerated procedure well with no complications and no evidence of recall      No complications documented.    Progress Note





- Progress Note


Date of Service: 07/02/18


SOAP: 


Subjective:


[]Overall better. No fevers. Up and walking to bathroom but not much down 

hallway. Eating fine. Reading but has difficulty with concentration. No 

abdominal pain. 











Acetaminophen (Tylenol Tab*)  650 mg PO Q6H PRN


   PRN Reason: fever, headache, pain


   Last Admin: 06/28/18 07:53 Dose:  650 mg


Cholecalciferol (Vitamin D Tab*)  2,000 units PO DAILY [facility]


   Last Admin: 07/02/18 09:30 Dose:  2,000 units


Citalopram Hydrobromide (Celexa Tab*)  10 mg PO DAILY [facility]


   Last Admin: 07/02/18 09:35 Dose:  10 mg


Enoxaparin Sodium (Lovenox(*))  90 mg SUBCUT DAILY [facility]


   Last Admin: 07/01/18 07:24 Dose:  90 mg


Heparin Sodium (Porcine) (Heparin Flush Port (Ivad)**)  5 ml FLUSH DAILY [facility]; 

Protocol


   Last Admin: 07/01/18 07:35 Dose:  Not Given


Cefepime HCl (Maxipime 2 Gm In Dextrose Duplex (*))  2 gm in 50 mls @ 100 mls/

hr IV Q8H [facility]


   Last Admin: 07/02/18 09:29 Dose:  100 mls/hr


Sodium Chloride (Ns 0.9% 1000 Ml*)  1,000 mls @ 75 mls/hr IV PER RATE [facility]


   Last Admin: 07/02/18 03:57 Dose:  75 mls/hr


Letrozole (Femara (Nf))  2.5 mg PO DAILY [facility]; Protocol


   Last Admin: 07/02/18 09:33 Dose:  2.5 mg


Magnesium Hydroxide (Milk Of Magnesia Liq*)  30 ml PO DAILY PRN


   PRN Reason: CONSTIPATION


Magnesium Oxide (Magox 400 Tab*)  800 mg PO BID [facility]


   Last Admin: 07/02/18 09:30 Dose:  800 mg


Pantoprazole Sodium (Protonix Tab (Nf))  40 mg PO DAILY [facility]


   Last Admin: 07/02/18 09:35 Dose:  40 mg


Pharmacy Profile Note (Scopolamine Patch Remove*)  1 note PATCH OFF Q72H [facility]


   Last Admin: 06/30/18 17:37 Dose:  1 patch


Scopolamine (Transderm-Scop 1.5 Mg Patch*)  1 patch TRANSDERM Q72H [facility]


   Last Admin: 06/30/18 17:35 Dose:  1 patch





Objective:


[]


 Vital Signs











Temp Pulse Resp BP Pulse Ox


 


 98.7 F   86   20   117/64   92 


 


 07/01/18 23:50  07/02/18 03:56  07/02/18 03:56  07/02/18 03:56  07/02/18 03:56








No distress.


HEENT: no oral lesions


CV: RRR, no m/r/g


Resp: crackles at lower lung fields, BL


Abd: soft and non tender. Still some fluid


Ext: no LE edema








Assessment:


77 year old with ovarian cancer admission after week 1 of cycle 4 chemotherapy w

/NF. Course previously complicated by PE and she is on anticoagulation.





1. NF. Will continue Cefepime and follow in house until ANC > 1000. Culture 

negatie and will d/c off antibiotics. 


2. Sepsis. Improved and off steroids. 


3. FEN. Will hold IVF, continue Mg. 


4. GI. Eating well and off steroids, hold PPI. LFTs improved.


5. PE. Will continue anticoagulation indefinitely.


6. Anemia. Likely chemotherapy induced. Will check retic, Iron and B12 as well. 


7. Ovarian Cancer.  Discussed overall plan of care and her meeting with Dr. Camilo. Plan will be for completion of this cycle of chemotherapy as tolerated 

and surgery in mid August.

## 2022-06-15 NOTE — PN
Progress Note





- Progress Note


Date of Service: 04/16/18


Note: 





Procedure note: 





Consent obtained placed in chart after discussing risks and benefits of 

procedure


Time out at bedside


Site marked with patient assistance


Site localized using ultrasound


2cc of 2% lidocaine used


 


Using seldinger technique a 5 Taj[place] needle was placed 


2200 cc of bloody fluid was collected and sent to the lab for cytology
Subjective


Date of Service: 04/16/18


Interval History: 





Seen with son at bedside this AM 


Notes nausea and difficulty eating 


+abdominal bloating and generalized pain





Objective


Active Medications: 








Citalopram Hydrobromide (Celexa Tab*)  10 mg PO DAILY [facility]


   Last Admin: 04/16/18 12:08 Dose:  Not Given


Sodium Chloride (Ns 0.9% 1000 Ml*)  1,000 mls @ 125 mls/hr IV PER RATE [facility]


   Last Admin: 04/16/18 12:10 Dose:  125 mls/hr


Morphine Sulfate (Morphine Vial*)  2 mg IV Q4H PRN


   PRN Reason: PAIN - MILD


Ondansetron HCl (Zofran Odt Tab*)  4 mg PO Q6H PRN


   PRN Reason: NAUSEA


   Last Admin: 04/16/18 13:11 Dose:  4 mg








 Vital Signs - 8 hr











  04/16/18





  15:29


 


Temperature 98.9 F


 


Pulse Rate 90


 


Respiratory 16





Rate 


 


Blood Pressure 113/62





(mmHg) 


 


O2 Sat by Pulse 93





Oximetry 











Oxygen Devices in Use Now: None


Appearance: thin, NAD


Eyes: No Scleral Icterus, PERRLA


Ears/Nose/Mouth/Throat: NL Teeth, Lips, Gums, Clear Oropharnyx


Neck: NL Appearance and Movements; NL JVP


Respiratory: Symmetrical Chest Expansion and Respiratory Effort, Clear to 

Auscultation


Cardiovascular: NL Sounds; No Murmurs; No JVD, RRR


Abdominal: - - distended, diffusesly ttp


Extremities: No Edema


Neurological: Alert and Oriented x 3


Result Diagrams: 


 04/16/18 05:29





 04/16/18 05:29


Additional Lab and Data: 


 Lab Results











  04/15/18 04/15/18 04/15/18 Range/Units





  15:45 15:45 15:45 


 


WBC     (3.5-10.8)  10^3/ul


 


RBC     (4.0-5.4)  10^6/ul


 


Hgb     (12.0-16.0)  g/dl


 


Hct     (35-47)  %


 


MCV     (80-97)  fL


 


MCH     (27-31)  pg


 


MCHC     (31-36)  g/dl


 


RDW     (10.5-15)  %


 


Plt Count     (150-450)  10^3/ul


 


MPV     (7.4-10.4)  um3


 


Neut % (Auto)     (38-83)  %


 


Lymph % (Auto)     (25-47)  %


 


Mono % (Auto)     (0-7)  %


 


Eos % (Auto)     (0-6)  %


 


Baso % (Auto)     (0-2)  %


 


Absolute Neuts (auto)     (1.5-7.7)  10^3/ul


 


Absolute Lymphs (auto)     (1.0-4.8)  10^3/ul


 


Absolute Monos (auto)     (0-0.8)  10^3/ul


 


Absolute Eos (auto)     (0-0.6)  10^3/ul


 


Absolute Basos (auto)     (0-0.2)  10^3/ul


 


Absolute Nucleated RBC     10^3/ul


 


Nucleated RBC %     


 


INR (Anticoag Therapy)  0.94    (0.77-1.02)  


 


APTT  23.2 L    (26.0-36.3)  seconds


 


Sodium    137 L  (139-145)  mmol/L


 


Potassium    4.1  (3.5-5.0)  mmol/L


 


Chloride    102  (101-111)  mmol/L


 


Carbon Dioxide    23  (22-32)  mmol/L


 


Anion Gap    12 H  (2-11)  mmol/L


 


BUN    27 H  (6-24)  mg/dL


 


Creatinine    1.04 H  (0.51-0.95)  mg/dL


 


Est GFR ( Amer)    66.4  (>60)  


 


Est GFR (Non-Af Amer)    51.7  (>60)  


 


BUN/Creatinine Ratio    26.0 H  (8-20)  


 


Glucose    111 H  ()  mg/dL


 


Lactic Acid     (0.5-2.0)  mmol/L


 


Calcium    9.1  (8.6-10.3)  mg/dL


 


Magnesium    2.0  (1.9-2.7)  mg/dL


 


Total Bilirubin    0.60  (0.2-1.0)  mg/dL


 


AST    18  (13-39)  U/L


 


ALT    8  (7-52)  U/L


 


Alkaline Phosphatase    45  ()  U/L


 


Total Creatine Kinase    52  ()  U/L


 


CK-MB (CK-2)    1.7  (0.6-6.3)  ng/mL


 


Troponin I    0.01  (<0.04)  ng/mL


 


C-Reactive Protein    78.76 H  (< 5.00)  mg/L


 


B-Natriuretic Peptide   20  ( - 100) pg/mL


 


Total Protein    6.5  (6.4-8.9)  g/dL


 


Albumin    3.4  (3.2-5.2)  g/dL


 


Globulin    3.1  (2-4)  g/dL


 


Albumin/Globulin Ratio    1.1  (1-3)  


 


TSH    1.24  (0.34-5.60)  mcIU/mL














  04/15/18 04/15/18 Range/Units





  15:45 15:45 


 


WBC  9.3   (3.5-10.8)  10^3/ul


 


RBC  3.90 L   (4.0-5.4)  10^6/ul


 


Hgb  12.0   (12.0-16.0)  g/dl


 


Hct  36   (35-47)  %


 


MCV  92   (80-97)  fL


 


MCH  31   (27-31)  pg


 


MCHC  34   (31-36)  g/dl


 


RDW  14   (10.5-15)  %


 


Plt Count  332   (150-450)  10^3/ul


 


MPV  8.2   (7.4-10.4)  um3


 


Neut % (Auto)  77.6   (38-83)  %


 


Lymph % (Auto)  11.2 L   (25-47)  %


 


Mono % (Auto)  10.0 H   (0-7)  %


 


Eos % (Auto)  0.5   (0-6)  %


 


Baso % (Auto)  0.7   (0-2)  %


 


Absolute Neuts (auto)  7.2   (1.5-7.7)  10^3/ul


 


Absolute Lymphs (auto)  1.0   (1.0-4.8)  10^3/ul


 


Absolute Monos (auto)  0.9 H   (0-0.8)  10^3/ul


 


Absolute Eos (auto)  0   (0-0.6)  10^3/ul


 


Absolute Basos (auto)  0.1   (0-0.2)  10^3/ul


 


Absolute Nucleated RBC  0   10^3/ul


 


Nucleated RBC %  0   


 


INR (Anticoag Therapy)    (0.77-1.02)  


 


APTT    (26.0-36.3)  seconds


 


Sodium    (139-145)  mmol/L


 


Potassium    (3.5-5.0)  mmol/L


 


Chloride    (101-111)  mmol/L


 


Carbon Dioxide    (22-32)  mmol/L


 


Anion Gap    (2-11)  mmol/L


 


BUN    (6-24)  mg/dL


 


Creatinine    (0.51-0.95)  mg/dL


 


Est GFR ( Amer)    (>60)  


 


Est GFR (Non-Af Amer)    (>60)  


 


BUN/Creatinine Ratio    (8-20)  


 


Glucose    ()  mg/dL


 


Lactic Acid   1.4  (0.5-2.0)  mmol/L


 


Calcium    (8.6-10.3)  mg/dL


 


Magnesium    (1.9-2.7)  mg/dL


 


Total Bilirubin    (0.2-1.0)  mg/dL


 


AST    (13-39)  U/L


 


ALT    (7-52)  U/L


 


Alkaline Phosphatase    ()  U/L


 


Total Creatine Kinase    ()  U/L


 


CK-MB (CK-2)    (0.6-6.3)  ng/mL


 


Troponin I    (<0.04)  ng/mL


 


C-Reactive Protein    (< 5.00)  mg/L


 


B-Natriuretic Peptide   ( - 100) pg/mL


 


Total Protein    (6.4-8.9)  g/dL


 


Albumin    (3.2-5.2)  g/dL


 


Globulin    (2-4)  g/dL


 


Albumin/Globulin Ratio    (1-3)  


 


TSH    (0.34-5.60)  mcIU/mL














Assess/Plan/Problems-Billing


Assessment: 





76 yo F p/w abdominal bloating found with CT A/P concerning for advanced 

metastatic/recurrent breast cancer vs a new malignancy





- Patient Problems


(1) Neoplasm


Comment: Appreciate oncology assistance


Paracenteiss performed at bedside today with 2200cc bloody fluid removed


Oncology to assume care of patient tomorrow   





(2) Abdominal distention


Comment: Bulky disease and ascites


para 4/16 with all fluid sent for cytology    





(3) DVT prophylaxis


Comment: SCDs with bloody paracentesis today
20

## 2023-03-09 NOTE — RAD
INDICATION:  Confusion and vomiting



COMPARISON: None.



TECHNIQUE: Contiguous axial sections of the brain were obtained from the skull base to the

vertex without contrast.



FINDINGS: 



The ventricles, cisterns and sulci mild symmetric involutional changes.  



There is mild periventricular and subcortical white matter hypoattenuation. Otherwise the 

gray-white matter differentiation is adequately maintained and there is no sulcal

effacement. No significant focal abnormality or mass effect is present. 



There is no evidence for intracranial hemorrhage.



No significant focal osseous abnormality is present. 



The visualized portion of the paranasal sinuses appear clear.



The mastoid air cells are well aerated bilaterally.



IMPRESSION:  Age-appropriate findings include appearance of chronic microvascular disease

and mild symmetric involutional change without acute intracranial findings.
INDICATION: 1 week of upper abdominal pain



COMPARISON: None.

 

TECHNIQUE: Single AP portable view of the chest was obtained.



FINDINGS: 



Image quality is compromised due to the relative inferiority of a portable chest x-ray.



The heart and mediastinum exhibit normal size and contour.



There is faint left lung base retrocardiac density. Elsewhere the lungs are clear. There

is no evidence of a large pleural effusion.



Visualized bones are normal for the patient's age.



IMPRESSION:  Retrocardiac density could be atelectasis and/or consolidation.
INDICATION: Confusion, vomiting and left lower quadrant pain



COMPARISON: CT abdomen pelvis dated October 30, 2017



TECHNIQUE: Multidetector CT images of the chest, abdomen and pelvis were obtained from the

lung apices to the ischial tuberosities following the injection of 81 mL Visipaque 320.

The patient received oral contrast as well..



CHEST: 



The lungs exhibit diffuse centrilobular emphysematous changes. The lateral aspect of the

right upper lobe there is a 4 mm calcified granuloma.



There is a small left pleural effusion with pleural-based linear densities at the left

lung base most consistent with atelectasis.



There is no mediastinal or hilar lymphadenopathy.



The heart and major vascular structures are grossly normal in appearance.



ABDOMEN & PELVIS: 



There is a mild to moderate size hiatal hernia.



The liver, pancreas and adrenal glands are grossly normal in appearance. 



In the posterior aspect of the spleen there is an 11 mm soft tissue focus. Scattered

calcified granulomas are also noted in the spleen.



Hyperattenuating gallstones are seen in the dependent portion of the gallbladder.



The kidneys are normal in appearance without focal mass, calcification or signs of

hydronephrosis. On the delayed phase images contrast is symmetrically and promptly

excreted.



There is oral contrast in the distal small bowel and colon. The small and large bowel are

not distended. 



There is diffuse large volume peritoneal ascites. There are amorphous soft tissue implants

seen throughout the peritoneal cavity. For example at the mid-level abdomen at the right

of midline there is an ill-defined soft tissue mass measuring 4.7 cm (axial image 47).

Immediately posterior to the posterior aspect of the lower right lobe of the liver there

is an ill-defined soft tissue mass measuring 4.5 cm that is either a solid soft tissue

mass or perhaps hemorrhage. There is more linear peritoneal caking along the anterior

right of midline abdomen (for example axial image 47). Along the inferior border of the

left lobe of the liver there is an ill-defined soft tissue mass measuring 4.8 cm (image 11

of 22).



There are irregular soft tissue masses in the pelvis. The uterus is not well-defined or is

surgically absent. At the left hemipelvis there is a mixed attenuation, partially cystic

partially solid mass measuring 7.3 x 7.9 cm in the axial plane.



The     abdominal aorta and iliac arteries are normal in course and diameter.



Degenerative changes of the thoracic and lumbar spine includes loss of intervertebral disc

height at multiple levels.



IMPRESSION: 



1. There are mixed attenuation and ill-defined masses in the pelvis in the presence of

large volume peritoneal ascites, soft tissue masses and peritoneal caking. This

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

gynecologic origin. The uterus is ill-defined or possibly surgically absent. Please

correlate to details of the patient's surgical history. Endometrial carcinoma versus

ovarian cancer are favored.

2. Small left pleural effusion with left lung base atelectasis corresponding to the same

day chest x-ray images.

3. Additional chronic and degenerative changes described in the body the report.
Yes